# Patient Record
Sex: FEMALE | Race: BLACK OR AFRICAN AMERICAN | NOT HISPANIC OR LATINO | Employment: FULL TIME | URBAN - METROPOLITAN AREA
[De-identification: names, ages, dates, MRNs, and addresses within clinical notes are randomized per-mention and may not be internally consistent; named-entity substitution may affect disease eponyms.]

---

## 2017-01-05 ENCOUNTER — HOSPITAL ENCOUNTER (EMERGENCY)
Facility: HOSPITAL | Age: 22
Discharge: HOME | End: 2017-01-05
Attending: EMERGENCY MEDICINE
Payer: COMMERCIAL

## 2017-01-05 VITALS
HEART RATE: 79 BPM | OXYGEN SATURATION: 99 % | WEIGHT: 139 LBS | HEIGHT: 65 IN | RESPIRATION RATE: 18 BRPM | BODY MASS INDEX: 23.16 KG/M2 | TEMPERATURE: 98 F | SYSTOLIC BLOOD PRESSURE: 110 MMHG | DIASTOLIC BLOOD PRESSURE: 69 MMHG

## 2017-01-05 DIAGNOSIS — Z34.91 NORMAL IUP (INTRAUTERINE PREGNANCY) ON PRENATAL ULTRASOUND, FIRST TRIMESTER: Primary | ICD-10-CM

## 2017-01-05 DIAGNOSIS — R10.2 SUPRAPUBIC CRAMPING: ICD-10-CM

## 2017-01-05 LAB
ALBUMIN SERPL BCP-MCNC: 4.1 G/DL
ALP SERPL-CCNC: 37 U/L
ALT SERPL W/O P-5'-P-CCNC: 10 U/L
ANION GAP SERPL CALC-SCNC: 8 MMOL/L
AST SERPL-CCNC: 17 U/L
B-HCG UR QL: POSITIVE
BACTERIA #/AREA URNS HPF: ABNORMAL /HPF
BACTERIA GENITAL QL WET PREP: ABNORMAL
BASOPHILS # BLD AUTO: 0.02 K/UL
BASOPHILS NFR BLD: 0.3 %
BILIRUB SERPL-MCNC: 0.2 MG/DL
BILIRUB UR QL STRIP: NEGATIVE
BUN SERPL-MCNC: 12 MG/DL
CALCIUM SERPL-MCNC: 9.2 MG/DL
CHLORIDE SERPL-SCNC: 103 MMOL/L
CLARITY UR: ABNORMAL
CLUE CELLS VAG QL WET PREP: ABNORMAL
CO2 SERPL-SCNC: 25 MMOL/L
COLOR UR: YELLOW
CREAT SERPL-MCNC: 0.8 MG/DL
CTP QC/QA: YES
DIFFERENTIAL METHOD: ABNORMAL
EOSINOPHIL # BLD AUTO: 0 K/UL
EOSINOPHIL NFR BLD: 0.2 %
ERYTHROCYTE [DISTWIDTH] IN BLOOD BY AUTOMATED COUNT: 13.1 %
EST. GFR  (AFRICAN AMERICAN): >60 ML/MIN/1.73 M^2
EST. GFR  (NON AFRICAN AMERICAN): >60 ML/MIN/1.73 M^2
FILAMENT FUNGI VAG WET PREP-#/AREA: ABNORMAL
GLUCOSE SERPL-MCNC: 85 MG/DL
GLUCOSE UR QL STRIP: NEGATIVE
HCG INTACT+B SERPL-ACNC: 4658 MIU/ML
HCT VFR BLD AUTO: 34 %
HGB BLD-MCNC: 11.6 G/DL
HGB UR QL STRIP: NEGATIVE
KETONES UR QL STRIP: NEGATIVE
LEUKOCYTE ESTERASE UR QL STRIP: ABNORMAL
LYMPHOCYTES # BLD AUTO: 2.4 K/UL
LYMPHOCYTES NFR BLD: 39.1 %
MCH RBC QN AUTO: 29.3 PG
MCHC RBC AUTO-ENTMCNC: 34.1 %
MCV RBC AUTO: 86 FL
MICROSCOPIC COMMENT: ABNORMAL
MONOCYTES # BLD AUTO: 0.5 K/UL
MONOCYTES NFR BLD: 7.7 %
NEUTROPHILS # BLD AUTO: 3.3 K/UL
NEUTROPHILS NFR BLD: 52.7 %
NITRITE UR QL STRIP: NEGATIVE
PH UR STRIP: 6 [PH] (ref 5–8)
PLATELET # BLD AUTO: 336 K/UL
PMV BLD AUTO: 8.7 FL
POTASSIUM SERPL-SCNC: 3.6 MMOL/L
PROT SERPL-MCNC: 7.3 G/DL
PROT UR QL STRIP: NEGATIVE
RBC # BLD AUTO: 3.96 M/UL
RBC #/AREA URNS HPF: 2 /HPF (ref 0–4)
SODIUM SERPL-SCNC: 136 MMOL/L
SP GR UR STRIP: 1.01 (ref 1–1.03)
SPECIMEN SOURCE: ABNORMAL
SQUAMOUS #/AREA URNS HPF: 6 /HPF
T VAGINALIS GENITAL QL WET PREP: ABNORMAL
URN SPEC COLLECT METH UR: ABNORMAL
UROBILINOGEN UR STRIP-ACNC: NEGATIVE EU/DL
WBC # BLD AUTO: 6.21 K/UL
WBC #/AREA URNS HPF: 15 /HPF (ref 0–5)
WBC #/AREA VAG WET PREP: ABNORMAL
YEAST GENITAL QL WET PREP: ABNORMAL

## 2017-01-05 PROCEDURE — 81000 URINALYSIS NONAUTO W/SCOPE: CPT

## 2017-01-05 PROCEDURE — 99284 EMERGENCY DEPT VISIT MOD MDM: CPT | Mod: 25

## 2017-01-05 PROCEDURE — 85025 COMPLETE CBC W/AUTO DIFF WBC: CPT

## 2017-01-05 PROCEDURE — 84702 CHORIONIC GONADOTROPIN TEST: CPT

## 2017-01-05 PROCEDURE — 87210 SMEAR WET MOUNT SALINE/INK: CPT

## 2017-01-05 PROCEDURE — 87088 URINE BACTERIA CULTURE: CPT

## 2017-01-05 PROCEDURE — 25000003 PHARM REV CODE 250: Performed by: NURSE PRACTITIONER

## 2017-01-05 PROCEDURE — 80053 COMPREHEN METABOLIC PANEL: CPT

## 2017-01-05 PROCEDURE — 87086 URINE CULTURE/COLONY COUNT: CPT

## 2017-01-05 PROCEDURE — 87591 N.GONORRHOEAE DNA AMP PROB: CPT

## 2017-01-05 PROCEDURE — 81025 URINE PREGNANCY TEST: CPT | Performed by: EMERGENCY MEDICINE

## 2017-01-05 PROCEDURE — 87147 CULTURE TYPE IMMUNOLOGIC: CPT

## 2017-01-05 RX ORDER — NITROFURANTOIN 25; 75 MG/1; MG/1
100 CAPSULE ORAL 2 TIMES DAILY
Qty: 14 CAPSULE | Refills: 0 | Status: SHIPPED | OUTPATIENT
Start: 2017-01-05 | End: 2017-01-09

## 2017-01-05 RX ORDER — NITROFURANTOIN 25; 75 MG/1; MG/1
100 CAPSULE ORAL ONCE
Status: COMPLETED | OUTPATIENT
Start: 2017-01-05 | End: 2017-01-05

## 2017-01-05 RX ADMIN — NITROFURANTOIN (MONOHYDRATE/MACROCRYSTALS) 100 MG: 75; 25 CAPSULE ORAL at 08:01

## 2017-01-05 NOTE — ED AVS SNAPSHOT
[facility]  [place] [address]               Bonnie Mcdonald   2017  6:33 PM   ED    Description:  Female : 1995   Department:  [facility]           Your Care was Coordinated By:     Provider Role From To    Erin Hurtado MD Attending Provider 17 --    Mary Babcock NP Nurse Practitioner 17 --      Reason for Visit     Abdominal Cramping           Diagnoses this Visit        Comments    Normal IUP (intrauterine pregnancy) on prenatal ultrasound, first trimester    -  Primary     Suprapubic cramping           ED Disposition     ED Disposition Condition Comment    Discharge             To Do List           Follow-up Information     Follow up with Mildred Stoddard MD.    Specialty:  Obstetrics and Gynecology    Why:  OBGYN - CALL FOR APPOINTMENT FOR ROUTINE FOLLOW    Contact information:    [address] [place] [address] 7  [place] [address]  [phone]          Follow up with [facility].    Specialty:  Emergency Medicine    Why:  If symptoms worsen    Contact information:    [place] [address] [phone]       These Medications        Disp Refills Start End    nitrofurantoin, macrocrystal-monohydrate, (MACROBID) 100 MG capsule 14 capsule 0 2017    Take 1 capsule (100 mg total) by mouth 2 (two) times daily. - Oral    Pharmacy: [facility] [address] [place] [address] AT [facility] Ph #: [phone]         [facility]     [facility] -  Assistance  Registered nurses in the [facility] provide clinical advisement, health education, appointment booking, and other advisory services.  Call for this free service at [phone].             Medications           Message regarding Medications     Verify the changes and/or additions to your medication regime listed  "below are the same as discussed with your clinician today.  If any of these changes or additions are incorrect, please notify your healthcare provider.        START taking these NEW medications        Refills    nitrofurantoin, macrocrystal-monohydrate, (MACROBID) 100 MG capsule 0    Sig: Take 1 capsule (100 mg total) by mouth 2 (two) times daily.    Class: Print    Route: Oral      These medications were administered today        Dose Freq    nitrofurantoin (macrocrystal-monohydrate) 100 MG capsule 100 mg 100 mg Once    Sig: Take 1 capsule (100 mg total) by mouth once.    Class: Normal    Route: Oral      STOP taking these medications     PNV cmb#21-iron-folic acid (PRENATAL COMPLETE)  mg-mcg Tab Take 1 tablet by mouth once daily.           Verify that the below list of medications is an accurate representation of the medications you are currently taking.  If none reported, the list may be blank. If incorrect, please contact your healthcare provider. Carry this list with you in case of emergency.           Current Medications     nitrofurantoin, macrocrystal-monohydrate, (MACROBID) 100 MG capsule Take 1 capsule (100 mg total) by mouth 2 (two) times daily.           Clinical Reference Information           Your Vitals Were     BP Pulse Temp Resp Height Weight    107/52 78 98.5 °F (36.9 °C) 18 5' 5" (1.651 m) 63 kg (139 lb)    Last Period SpO2 BMI          11/27/2016 (Approximate) 99% 23.13 kg/m2        Allergies as of 1/5/2017     No Known Allergies      Immunizations Administered on Date of Encounter - 1/5/2017     None      ED Micro, Lab, POCT     Start Ordered       Status Ordering Provider    01/05/17 2049 01/05/17 2049  Urine culture **CANNOT BE ORDERED STAT**  Once      In process     01/05/17 1858 01/05/17 1857  hCG, quantitative, pregnancy  STAT      Final result     01/05/17 1858 01/05/17 1857  CBC auto differential  STAT      Final result     01/05/17 1858 01/05/17 1857  Comprehensive metabolic panel "  STAT      Final result     01/05/17 1856 01/05/17 1857  Vaginal Screen Vagina  STAT      Final result     01/05/17 1856 01/05/17 1857  C. trachomatis/N. gonorrhoeae by AMP DNA Cervix  STAT      In process     01/05/17 1826 01/05/17 1825  Urinalysis  STAT      Final result     01/05/17 1825 01/05/17 1825  Urinalysis Microscopic  Once      Final result     01/05/17 1808 01/05/17 1807  POCT urine pregnancy  Once      Final result       ED Imaging Orders     Start Ordered       Status Ordering Provider    01/05/17 1900 01/05/17 1900   OB Less Than 14 Wks with Transvaginal (xpd)  1 time imaging      Final result         Discharge Instructions       Please follow-up with an OB/GYN for further evaluation of your symptoms and for routine prenatal screening.    Take Macrobid for 1 week for a urinary tract infection.    You can take Tylenol for pain, as needed.  Please do not exceed 4000 mg of Tylenol a day.    Return to the emergency room for any new or worsening symptoms or concerns.    Your Scheduled Appointments     Jan 09, 2017 10:10 AM CST   New OB Visit-OCC with Gale Armstrong MD   [facility] (O[facility])    [address] [place] [id number]   [phone]              [facility] Sign-Up     Activating your [facility] account is as easy as 1-2-3!     1) Visit [email or web address], select Sign Up Now, enter this activation code and your date of birth, then select Next.  [email or web address]  Expires: 2/19/2017  9:00 PM      2) Create a username and password to use when you visit [facility] in the future and select a security question in case you lose your password and select Next.    3) Enter your e-mail address and click Sign Up!    Additional Information  If you have questions, please e-mail [email or web address] or call [phone] to talk to our [facility] staff. Remember, [facility] is NOT to be used for urgent needs. For medical emergencies, dial 911.          [facility] complies with  applicable Federal civil rights laws and does not discriminate on the basis of race, color, national origin, age, disability, or sex.        Language Assistance Services     ATTENTION: Language assistance services are available, free of charge. Please call [phone].      ATENCIÓN: Si habla tamiko, tiene a villalta disposición servicios gratuitos de asistencia lingüística. Llame al [phone].     CHÚ Ý: N?u b?n nói Ti?ng Vi?t, có các d?ch v? h? tr? ngôn ng? mi?n phí dành cho b?n. G?i s? [phone].

## 2017-01-06 NOTE — DISCHARGE INSTRUCTIONS
Please follow-up with an OB/GYN for further evaluation of your symptoms and for routine prenatal screening.    Take Macrobid for 1 week for a urinary tract infection.    You can take Tylenol for pain, as needed.  Please do not exceed 4000 mg of Tylenol a day.    Return to the emergency room for any new or worsening symptoms or concerns.

## 2017-01-06 NOTE — ED PROVIDER NOTES
Encounter Date: 2017    SCRIBE #1 NOTE: I, Sheree Knutson, am scribing for, and in the presence of,  Mary Babcock NP. I have scribed the following portions of the note - Other sections scribed: HPI and ROS.       History     Chief Complaint   Patient presents with    Abdominal Cramping     reports abdominal cramping for two days, had positive preg test yesterday, would like confirmed, denies n/v/d/f, denies medical hx     Review of patient's allergies indicates:  No Known Allergies  HPI Comments: CC: Abdominal Cramping    HPI: This [id number] 21 y.o. Female, LMP 16, with no medical history presents to the ED c/o acute lower abdominal cramping for the past 2x days. Pt reports taking a UPT at home with positive results. She presently denies experiencing the abdominal cramping. Pt denies nausea, emesis, diarrhea, urinary symptoms, fever, chills and vaginal bleeding. No other associated symptoms. Pt notes that she has not been evaluated by an OBGYN since becoming pregnant.        The history is provided by the patient. No  was used.     History reviewed. No pertinent past medical history.  Past Medical History Pertinent Negatives   Diagnosis Date Noted    Diabetes mellitus 2013     Past Surgical History   Procedure Laterality Date    Miscarriage        Family History   Problem Relation Age of Onset    Diabetes Father     Breast cancer Neg Hx     Colon cancer Neg Hx     Ovarian cancer Neg Hx      Social History   Substance Use Topics    Smoking status: Never Smoker    Smokeless tobacco: None    Alcohol use No     Review of Systems   Constitutional: Negative for chills and fever.   HENT: Negative for sore throat.    Respiratory: Negative for shortness of breath.    Cardiovascular: Negative for chest pain.   Gastrointestinal: Positive for abdominal pain (lower abdominal cramping; resolved). Negative for diarrhea, nausea and vomiting.   Genitourinary: Negative for dysuria and  vaginal bleeding.   Musculoskeletal: Negative for back pain.   Skin: Negative for rash.   Neurological: Negative for weakness.       Physical Exam   Initial Vitals   BP Pulse Resp Temp SpO2   01/05/17 1806 01/05/17 1806 01/05/17 1806 01/05/17 1806 01/05/17 1806   128/65 92 16 98.3 °F (36.8 °C) 99 %     Physical Exam    Nursing note and vitals reviewed.  Constitutional: Vital signs are normal. She appears well-developed and well-nourished. She is not diaphoretic. She is active and cooperative. She does not appear ill. No distress.   Eyes: Pupils are equal, round, and reactive to light.   Pulmonary/Chest: No respiratory distress.   Abdominal: Soft. Normal appearance. There is no tenderness. There is no rigidity, no rebound, no guarding and no CVA tenderness.   Genitourinary: Uterus normal. There is no rash or lesion on the right labia. There is no rash or lesion on the left labia. Cervix exhibits no motion tenderness, no discharge and no friability. Right adnexum displays no mass and no tenderness. Left adnexum displays no mass and no tenderness. No erythema, tenderness or bleeding in the vagina. No foreign body in the vagina. No signs of injury around the vagina. Vaginal discharge (moderate white discharge in the vaginal vault) found.   Genitourinary Comments: Cervical os is closed with no adnexal masses, tenderness, bleeding.   Neurological: She is alert and oriented to person, place, and time.         ED Course   Procedures  Labs Reviewed   URINALYSIS - Abnormal; Notable for the following:        Result Value    Appearance, UA Hazy (*)     Leukocytes, UA 3+ (*)     All other components within normal limits   URINALYSIS MICROSCOPIC - Abnormal; Notable for the following:     WBC, UA 15 (*)     All other components within normal limits   VAGINAL SCREEN - Abnormal; Notable for the following:     WBC - Vaginal Screen Occasional (*)     Bacteria - Vaginal Screen Moderate (*)     All other components within normal limits    CBC W/ AUTO DIFFERENTIAL - Abnormal; Notable for the following:     RBC 3.96 (*)     Hemoglobin 11.6 (*)     Hematocrit 34.0 (*)     MPV 8.7 (*)     All other components within normal limits   COMPREHENSIVE METABOLIC PANEL - Abnormal; Notable for the following:     Alkaline Phosphatase 37 (*)     All other components within normal limits   POCT URINE PREGNANCY - Abnormal; Notable for the following:     POC Preg Test, Ur Positive (*)     All other components within normal limits   C. TRACHOMATIS/N. GONORRHOEAE BY AMP DNA   CULTURE, URINE   HCG, QUANTITATIVE, PREGNANCY                Additional MDM:   Comments: This is an urgent evaluation of a 21-year-old gravid female that comes the emergency room complaining of lower abdominal cramping for 2 days.  Her LMP is 16; she is approximately.  She is  M1 A1.  She has not had prenatal follow for this pregnancy yet.  She had a positive home UPT 2 days ago and suspects that she is pregnant today.  She is completely asymptomatic on exam, with no abdominal pain or tenderness to palpation.  Her abdomen is soft, nondistended, with no peritoneal signs.  Her differential diagnoses include but are not limited to: Pregnancy, UTI, vaginal infection, ovarian cysts, uterine fibroids, endometriosis, threatened AB, ectopic pregnancy.  I carefully considered but highly doubted: Acute cholecystitis, pancreatitis, hepatitis, cholelithiasis, appendicitis, renal colic, diverticulitis, bowel obstruction.  Pelvic exam was grossly unremarkable, with no evidence of cervicitis, adnexal masses or tenderness, or rashes or lesions.  Her cervical os was closed.  There was no vaginal bleeding.  No concern for tubo-ovarian abscess or PID.  GC is pending.  Her blood work was significant for a hemoglobin and hematocrit 11/34-no indication for transfusion at this time.  She is blood type A POS on file; no indication for RhoGAM.  Her CMP showed no electrolyte disturbance or renal insufficiency.   Her transaminases , T bili, lipase were all within normal limits.  Her beta hCG was 4658-which clinically correlates with transvaginal ultrasound findings of a single IUP with estimated gestational age of 5 weeks and 5 days.  There were no suspicious or adnexal mass suggestive ectopic pregnancy.  There was no evidence of torsion.  There is no fetal cardiac activity appreciated, but this may be due to early gestation.  Her urine is possibly infected.  There were 3+ leukocytes with bacteria with pyuria.  Will send urine culture and cover her with Macrobid.  I advised the patient to follow-up with OB/GYN for routine follow and for further evaluation of her symptoms in 2-3 days.  Return precautions were given for any new or worsening symptoms or concerns.  The patient verbalized understanding and adherence to treatment plan.  Case discussed with attending, , and she is in agreement with plan. Stable for discharge and outpatient follow.  .          Scribe Attestation:   Scribe #1: I performed the above scribed service and the documentation accurately describes the services I performed. I attest to the accuracy of the note.    Attending Attestation:           Physician Attestation for Scribe:  Physician Attestation Statement for Scribe #1: I, Mary Babcock NP, reviewed documentation, as scribed by Sheree Knutson in my presence, and it is both accurate and complete.                 ED Course     Clinical Impression:   The primary encounter diagnosis was Normal IUP (intrauterine pregnancy) on prenatal ultrasound, first trimester. A diagnosis of Suprapubic cramping was also pertinent to this visit.    Disposition:   Disposition: Discharged  Condition: Stable       Mary Babcock NP  01/05/17 4709

## 2017-01-08 LAB — BACTERIA UR CULT: NORMAL

## 2017-01-09 PROBLEM — R82.71 GBS BACTERIURIA: Status: ACTIVE | Noted: 2017-01-09

## 2017-01-09 LAB
C TRACH DNA SPEC QL NAA+PROBE: NEGATIVE
N GONORRHOEA DNA SPEC QL NAA+PROBE: NEGATIVE

## 2017-01-12 PROBLEM — Z21 HIV ANTIBODY POSITIVE: Status: ACTIVE | Noted: 2017-01-12

## 2017-01-17 PROBLEM — B20 HIV (HUMAN IMMUNODEFICIENCY VIRUS INFECTION): Status: ACTIVE | Noted: 2017-01-12

## 2017-08-17 ENCOUNTER — SURGERY (OUTPATIENT)
Age: 22
End: 2017-08-17

## 2017-08-17 ENCOUNTER — HOSPITAL ENCOUNTER (INPATIENT)
Facility: HOSPITAL | Age: 22
LOS: 3 days | Discharge: HOME | End: 2017-08-20
Attending: OBSTETRICS & GYNECOLOGY | Admitting: OBSTETRICS & GYNECOLOGY
Payer: MEDICAID

## 2017-08-17 ENCOUNTER — ANESTHESIA EVENT (OUTPATIENT)
Dept: OBSTETRICS AND GYNECOLOGY | Facility: HOSPITAL | Age: 22
End: 2017-08-17
Payer: MEDICAID

## 2017-08-17 ENCOUNTER — ANESTHESIA (OUTPATIENT)
Dept: OBSTETRICS AND GYNECOLOGY | Facility: HOSPITAL | Age: 22
End: 2017-08-17
Payer: MEDICAID

## 2017-08-17 DIAGNOSIS — Z98.891 STATUS POST PRIMARY LOW TRANSVERSE CESAREAN SECTION: Primary | ICD-10-CM

## 2017-08-17 DIAGNOSIS — Z91.199 NON-COMPLIANCE: ICD-10-CM

## 2017-08-17 DIAGNOSIS — Z34.93 PREGNANCY WITH ONE FETUS, THIRD TRIMESTER: ICD-10-CM

## 2017-08-17 DIAGNOSIS — B20 HIV (HUMAN IMMUNODEFICIENCY VIRUS INFECTION): ICD-10-CM

## 2017-08-17 DIAGNOSIS — O98.713 HIV DISEASE AFFECTING PREGNANCY IN THIRD TRIMESTER, ANTEPARTUM: ICD-10-CM

## 2017-08-17 DIAGNOSIS — Z34.90 PREGNANCY WITH ONE FETUS: ICD-10-CM

## 2017-08-17 PROBLEM — O09.33 INSUFFICIENT PRENATAL CARE IN THIRD TRIMESTER: Status: ACTIVE | Noted: 2017-08-17

## 2017-08-17 LAB
ABO + RH BLD: NORMAL
BASOPHILS # BLD AUTO: 0.02 K/UL
BASOPHILS NFR BLD: 0.2 %
BLD GP AB SCN CELLS X3 SERPL QL: NORMAL
DIFFERENTIAL METHOD: ABNORMAL
EOSINOPHIL # BLD AUTO: 0 K/UL
EOSINOPHIL NFR BLD: 0.1 %
ERYTHROCYTE [DISTWIDTH] IN BLOOD BY AUTOMATED COUNT: 12.3 %
HCT VFR BLD AUTO: 27.6 %
HGB BLD-MCNC: 9.3 G/DL
LYMPHOCYTES # BLD AUTO: 2 K/UL
LYMPHOCYTES NFR BLD: 20.2 %
MCH RBC QN AUTO: 29.1 PG
MCHC RBC AUTO-ENTMCNC: 33.7 G/DL
MCV RBC AUTO: 86 FL
MONOCYTES # BLD AUTO: 1 K/UL
MONOCYTES NFR BLD: 10.1 %
NEUTROPHILS # BLD AUTO: 6.7 K/UL
NEUTROPHILS NFR BLD: 69.4 %
PLATELET # BLD AUTO: 320 K/UL
PMV BLD AUTO: 8.4 FL
RBC # BLD AUTO: 3.2 M/UL
WBC # BLD AUTO: 9.63 K/UL

## 2017-08-17 PROCEDURE — 63600175 PHARM REV CODE 636 W HCPCS: Performed by: OBSTETRICS & GYNECOLOGY

## 2017-08-17 PROCEDURE — 25000003 PHARM REV CODE 250: Performed by: OBSTETRICS & GYNECOLOGY

## 2017-08-17 PROCEDURE — 72100002 HC LABOR CARE, 1ST 8 HOURS

## 2017-08-17 PROCEDURE — 63600175 PHARM REV CODE 636 W HCPCS: Performed by: ANESTHESIOLOGY

## 2017-08-17 PROCEDURE — 25000003 PHARM REV CODE 250: Performed by: NURSE ANESTHETIST, CERTIFIED REGISTERED

## 2017-08-17 PROCEDURE — 99211 OFF/OP EST MAY X REQ PHY/QHP: CPT

## 2017-08-17 PROCEDURE — 86901 BLOOD TYPING SEROLOGIC RH(D): CPT

## 2017-08-17 PROCEDURE — 36415 COLL VENOUS BLD VENIPUNCTURE: CPT

## 2017-08-17 PROCEDURE — 59514 CESAREAN DELIVERY ONLY: CPT | Mod: CRNA,,, | Performed by: NURSE ANESTHETIST, CERTIFIED REGISTERED

## 2017-08-17 PROCEDURE — 11000001 HC ACUTE MED/SURG PRIVATE ROOM

## 2017-08-17 PROCEDURE — 59514 CESAREAN DELIVERY ONLY: CPT | Mod: AT,,, | Performed by: OBSTETRICS & GYNECOLOGY

## 2017-08-17 PROCEDURE — 87901 NFCT AGT GNTYP ALYS HIV1 REV: CPT

## 2017-08-17 PROCEDURE — 36000685 HC CESAREAN SECTION LEVEL I

## 2017-08-17 PROCEDURE — 63600175 PHARM REV CODE 636 W HCPCS: Performed by: NURSE ANESTHETIST, CERTIFIED REGISTERED

## 2017-08-17 PROCEDURE — 86361 T CELL ABSOLUTE COUNT: CPT

## 2017-08-17 PROCEDURE — 27200688 HC TRAY, SPINAL-HYPER/ ISOBARIC: Performed by: ANESTHESIOLOGY

## 2017-08-17 PROCEDURE — 51702 INSERT TEMP BLADDER CATH: CPT

## 2017-08-17 PROCEDURE — 87536 HIV-1 QUANT&REVRSE TRNSCRPJ: CPT

## 2017-08-17 PROCEDURE — 86900 BLOOD TYPING SEROLOGIC ABO: CPT

## 2017-08-17 PROCEDURE — 37000009 HC ANESTHESIA EA ADD 15 MINS: Performed by: OBSTETRICS & GYNECOLOGY

## 2017-08-17 PROCEDURE — S0028 INJECTION, FAMOTIDINE, 20 MG: HCPCS | Performed by: OBSTETRICS & GYNECOLOGY

## 2017-08-17 PROCEDURE — 37000008 HC ANESTHESIA 1ST 15 MINUTES: Performed by: OBSTETRICS & GYNECOLOGY

## 2017-08-17 PROCEDURE — 59514 CESAREAN DELIVERY ONLY: CPT | Mod: ANES,,, | Performed by: ANESTHESIOLOGY

## 2017-08-17 PROCEDURE — 85025 COMPLETE CBC W/AUTO DIFF WBC: CPT

## 2017-08-17 RX ORDER — NALOXONE HCL 0.4 MG/ML
0.02 VIAL (ML) INJECTION
Status: DISCONTINUED | OUTPATIENT
Start: 2017-08-17 | End: 2017-08-18

## 2017-08-17 RX ORDER — LOPINAVIR AND RITONAVIR 200; 50 MG/1; MG/1
2 TABLET, FILM COATED ORAL 2 TIMES DAILY
Status: CANCELLED | OUTPATIENT
Start: 2017-08-17

## 2017-08-17 RX ORDER — OXYCODONE AND ACETAMINOPHEN 5; 325 MG/1; MG/1
1 TABLET ORAL EVERY 4 HOURS PRN
Status: DISCONTINUED | OUTPATIENT
Start: 2017-08-17 | End: 2017-08-20 | Stop reason: HOSPADM

## 2017-08-17 RX ORDER — DIPHENHYDRAMINE HCL 25 MG
25 CAPSULE ORAL EVERY 4 HOURS PRN
Status: DISCONTINUED | OUTPATIENT
Start: 2017-08-17 | End: 2017-08-20 | Stop reason: HOSPADM

## 2017-08-17 RX ORDER — AMOXICILLIN 250 MG
1 CAPSULE ORAL NIGHTLY PRN
Status: DISCONTINUED | OUTPATIENT
Start: 2017-08-17 | End: 2017-08-20 | Stop reason: HOSPADM

## 2017-08-17 RX ORDER — METOCLOPRAMIDE 10 MG/1
10 TABLET ORAL ONCE
Status: DISCONTINUED | OUTPATIENT
Start: 2017-08-17 | End: 2017-08-17

## 2017-08-17 RX ORDER — CARBOPROST TROMETHAMINE 250 UG/ML
250 INJECTION, SOLUTION INTRAMUSCULAR
Status: DISCONTINUED | OUTPATIENT
Start: 2017-08-17 | End: 2017-08-17

## 2017-08-17 RX ORDER — SIMETHICONE 80 MG
1 TABLET,CHEWABLE ORAL EVERY 6 HOURS PRN
Status: DISCONTINUED | OUTPATIENT
Start: 2017-08-17 | End: 2017-08-20 | Stop reason: HOSPADM

## 2017-08-17 RX ORDER — ONDANSETRON 2 MG/ML
4 INJECTION INTRAMUSCULAR; INTRAVENOUS EVERY 12 HOURS PRN
Status: DISCONTINUED | OUTPATIENT
Start: 2017-08-17 | End: 2017-08-18

## 2017-08-17 RX ORDER — SODIUM CITRATE AND CITRIC ACID MONOHYDRATE 334; 500 MG/5ML; MG/5ML
30 SOLUTION ORAL
Status: DISCONTINUED | OUTPATIENT
Start: 2017-08-17 | End: 2017-08-17

## 2017-08-17 RX ORDER — LAMIVUDINE AND ZIDOVUDINE 150; 300 MG/1; MG/1
1 TABLET, FILM COATED ORAL EVERY 12 HOURS
Status: CANCELLED | OUTPATIENT
Start: 2017-08-17

## 2017-08-17 RX ORDER — METOCLOPRAMIDE HYDROCHLORIDE 5 MG/ML
10 INJECTION INTRAMUSCULAR; INTRAVENOUS
Status: COMPLETED | OUTPATIENT
Start: 2017-08-17 | End: 2017-08-17

## 2017-08-17 RX ORDER — FAMOTIDINE 10 MG/ML
20 INJECTION INTRAVENOUS
Status: DISCONTINUED | OUTPATIENT
Start: 2017-08-17 | End: 2017-08-17

## 2017-08-17 RX ORDER — MEPERIDINE HYDROCHLORIDE 50 MG/ML
50 INJECTION INTRAMUSCULAR; INTRAVENOUS; SUBCUTANEOUS ONCE
Status: COMPLETED | OUTPATIENT
Start: 2017-08-17 | End: 2017-08-17

## 2017-08-17 RX ORDER — SODIUM CHLORIDE, SODIUM LACTATE, POTASSIUM CHLORIDE, CALCIUM CHLORIDE 600; 310; 30; 20 MG/100ML; MG/100ML; MG/100ML; MG/100ML
INJECTION, SOLUTION INTRAVENOUS CONTINUOUS
Status: DISCONTINUED | OUTPATIENT
Start: 2017-08-17 | End: 2017-08-20

## 2017-08-17 RX ORDER — METHYLERGONOVINE MALEATE 0.2 MG/ML
200 INJECTION INTRAVENOUS
Status: DISCONTINUED | OUTPATIENT
Start: 2017-08-17 | End: 2017-08-17

## 2017-08-17 RX ORDER — LAMIVUDINE AND ZIDOVUDINE 150; 300 MG/1; MG/1
1 TABLET, FILM COATED ORAL EVERY 12 HOURS
Status: DISCONTINUED | OUTPATIENT
Start: 2017-08-17 | End: 2017-08-18

## 2017-08-17 RX ORDER — KETOROLAC TROMETHAMINE 30 MG/ML
INJECTION, SOLUTION INTRAMUSCULAR; INTRAVENOUS
Status: DISCONTINUED | OUTPATIENT
Start: 2017-08-17 | End: 2017-08-17

## 2017-08-17 RX ORDER — OXYTOCIN 10 [USP'U]/ML
INJECTION, SOLUTION INTRAMUSCULAR; INTRAVENOUS
Status: DISCONTINUED | OUTPATIENT
Start: 2017-08-17 | End: 2017-08-17

## 2017-08-17 RX ORDER — CEFAZOLIN SODIUM 2 G/50ML
2 SOLUTION INTRAVENOUS
Status: COMPLETED | OUTPATIENT
Start: 2017-08-17 | End: 2017-08-17

## 2017-08-17 RX ORDER — OXYTOCIN/RINGER'S LACTATE 20/1000 ML
41.65 PLASTIC BAG, INJECTION (ML) INTRAVENOUS CONTINUOUS
Status: ACTIVE | OUTPATIENT
Start: 2017-08-17 | End: 2017-08-18

## 2017-08-17 RX ORDER — FENTANYL CITRATE 50 UG/ML
INJECTION, SOLUTION INTRAMUSCULAR; INTRAVENOUS
Status: DISCONTINUED | OUTPATIENT
Start: 2017-08-17 | End: 2017-08-17

## 2017-08-17 RX ORDER — LOPINAVIR AND RITONAVIR 200; 50 MG/1; MG/1
2 TABLET, FILM COATED ORAL 2 TIMES DAILY
Status: DISCONTINUED | OUTPATIENT
Start: 2017-08-17 | End: 2017-08-18

## 2017-08-17 RX ORDER — DEXTROSE, SODIUM CHLORIDE, SODIUM LACTATE, POTASSIUM CHLORIDE, AND CALCIUM CHLORIDE 5; .6; .31; .03; .02 G/100ML; G/100ML; G/100ML; G/100ML; G/100ML
INJECTION, SOLUTION INTRAVENOUS CONTINUOUS
Status: DISCONTINUED | OUTPATIENT
Start: 2017-08-17 | End: 2017-08-20 | Stop reason: HOSPADM

## 2017-08-17 RX ORDER — HYDROMORPHONE HCL IN 0.9% NACL 6 MG/30 ML
PATIENT CONTROLLED ANALGESIA SYRINGE INTRAVENOUS CONTINUOUS
Status: DISCONTINUED | OUTPATIENT
Start: 2017-08-17 | End: 2017-08-18

## 2017-08-17 RX ORDER — DIPHENHYDRAMINE HYDROCHLORIDE 50 MG/ML
12.5 INJECTION INTRAMUSCULAR; INTRAVENOUS EVERY 4 HOURS PRN
Status: DISCONTINUED | OUTPATIENT
Start: 2017-08-17 | End: 2017-08-18

## 2017-08-17 RX ORDER — OXYCODONE AND ACETAMINOPHEN 10; 325 MG/1; MG/1
1 TABLET ORAL EVERY 4 HOURS PRN
Status: DISCONTINUED | OUTPATIENT
Start: 2017-08-17 | End: 2017-08-20 | Stop reason: HOSPADM

## 2017-08-17 RX ORDER — MISOPROSTOL 200 UG/1
800 TABLET ORAL
Status: DISCONTINUED | OUTPATIENT
Start: 2017-08-17 | End: 2017-08-20 | Stop reason: HOSPADM

## 2017-08-17 RX ORDER — ONDANSETRON HYDROCHLORIDE 2 MG/ML
INJECTION, SOLUTION INTRAMUSCULAR; INTRAVENOUS
Status: DISCONTINUED | OUTPATIENT
Start: 2017-08-17 | End: 2017-08-17

## 2017-08-17 RX ADMIN — SODIUM CHLORIDE, SODIUM LACTATE, POTASSIUM CHLORIDE, AND CALCIUM CHLORIDE: .6; .31; .03; .02 INJECTION, SOLUTION INTRAVENOUS at 09:08

## 2017-08-17 RX ADMIN — ONDANSETRON 4 MG: 2 INJECTION, SOLUTION INTRAMUSCULAR; INTRAVENOUS at 04:08

## 2017-08-17 RX ADMIN — FENTANYL CITRATE 50 MCG: 50 INJECTION, SOLUTION INTRAMUSCULAR; INTRAVENOUS at 05:08

## 2017-08-17 RX ADMIN — FENTANYL CITRATE 10 MCG: 50 INJECTION, SOLUTION INTRAMUSCULAR; INTRAVENOUS at 05:08

## 2017-08-17 RX ADMIN — LOPINAVIR AND RITONAVIR 2 TABLET: 200; 50 TABLET, FILM COATED ORAL at 09:08

## 2017-08-17 RX ADMIN — Medication 20 MG: at 05:08

## 2017-08-17 RX ADMIN — Medication: at 06:08

## 2017-08-17 RX ADMIN — ZIDOVUDINE 1 MG/KG/HR: 10 INJECTION, SOLUTION INTRAVENOUS at 03:08

## 2017-08-17 RX ADMIN — PROMETHAZINE HYDROCHLORIDE 25 MG: 25 INJECTION INTRAMUSCULAR; INTRAVENOUS at 08:08

## 2017-08-17 RX ADMIN — FAMOTIDINE 20 MG: 10 INJECTION, SOLUTION INTRAVENOUS at 04:08

## 2017-08-17 RX ADMIN — OXYTOCIN 20 UNITS: 10 INJECTION, SOLUTION INTRAMUSCULAR; INTRAVENOUS at 05:08

## 2017-08-17 RX ADMIN — SODIUM CHLORIDE, SODIUM LACTATE, POTASSIUM CHLORIDE, AND CALCIUM CHLORIDE: .6; .31; .03; .02 INJECTION, SOLUTION INTRAVENOUS at 05:08

## 2017-08-17 RX ADMIN — ONDANSETRON 4 MG: 2 INJECTION INTRAMUSCULAR; INTRAVENOUS at 11:08

## 2017-08-17 RX ADMIN — SODIUM CITRATE AND CITRIC ACID MONOHYDRATE 30 ML: 500; 334 SOLUTION ORAL at 04:08

## 2017-08-17 RX ADMIN — SODIUM CHLORIDE, SODIUM LACTATE, POTASSIUM CHLORIDE, AND CALCIUM CHLORIDE: .6; .31; .03; .02 INJECTION, SOLUTION INTRAVENOUS at 08:08

## 2017-08-17 RX ADMIN — MEPERIDINE HYDROCHLORIDE 50 MG: 50 INJECTION INTRAMUSCULAR; INTRAVENOUS; SUBCUTANEOUS at 08:08

## 2017-08-17 RX ADMIN — SODIUM CHLORIDE, SODIUM LACTATE, POTASSIUM CHLORIDE, AND CALCIUM CHLORIDE: .6; .31; .03; .02 INJECTION, SOLUTION INTRAVENOUS at 02:08

## 2017-08-17 RX ADMIN — METOCLOPRAMIDE 10 MG: 5 INJECTION, SOLUTION INTRAMUSCULAR; INTRAVENOUS at 04:08

## 2017-08-17 RX ADMIN — LAMIVUDINE AND ZIDOVUDINE 1 TABLET: 150; 300 TABLET, FILM COATED ORAL at 09:08

## 2017-08-17 RX ADMIN — SODIUM CHLORIDE, SODIUM LACTATE, POTASSIUM CHLORIDE, AND CALCIUM CHLORIDE: .6; .31; .03; .02 INJECTION, SOLUTION INTRAVENOUS at 11:08

## 2017-08-17 RX ADMIN — CEFAZOLIN SODIUM 2 G: 2 SOLUTION INTRAVENOUS at 04:08

## 2017-08-17 RX ADMIN — ZIDOVUDINE 115 MG: 10 INJECTION, SOLUTION INTRAVENOUS at 01:08

## 2017-08-17 RX ADMIN — FENTANYL CITRATE 40 MCG: 50 INJECTION, SOLUTION INTRAMUSCULAR; INTRAVENOUS at 05:08

## 2017-08-17 RX ADMIN — KETOROLAC TROMETHAMINE 30 MG: 30 INJECTION, SOLUTION INTRAMUSCULAR; INTRAVENOUS at 05:08

## 2017-08-17 RX ADMIN — OXYTOCIN 5 UNITS: 10 INJECTION, SOLUTION INTRAMUSCULAR; INTRAVENOUS at 05:08

## 2017-08-17 NOTE — ANESTHESIA PREPROCEDURE EVALUATION
2017  Bonnie Mcdonald is a 22 y.o., female   Pre-operative evaluation for Procedure(s) (LRB):  DELIVERY- SECTION (N/A)    Bonnie Mcdonald is a 22 y.o. female   OB History      Para Term  AB Living    5 2 2   2 2    SAB TAB Ectopic Multiple Live Births    1 1     2            Patient Active Problem List   Diagnosis    GBS bacteriuria    HIV (human immunodeficiency virus infection)    Pregnancy with one fetus    HIV disease affecting pregnancy in third trimester, antepartum    Non-compliance    Insufficient prenatal care in third trimester       Review of patient's allergies indicates:  No Known Allergies    No current facility-administered medications on file prior to encounter.      Current Outpatient Prescriptions on File Prior to Encounter   Medication Sig Dispense Refill    prenatal vit#103-iron-FA () 27 mg iron- 1 mg Tab Take 1 tablet by mouth once daily. 30 tablet 11       Past Surgical History:   Procedure Laterality Date    miscarriage          Social History     Social History    Marital status: Single     Spouse name: N/A    Number of children: N/A    Years of education: N/A     Occupational History    Not on file.     Social History Main Topics    Smoking status: Never Smoker    Smokeless tobacco: Not on file    Alcohol use No    Drug use: No    Sexual activity: Yes     Partners: Male     Birth control/ protection: None     Other Topics Concern    Not on file     Social History Narrative    No narrative on file         Vital Signs Range (Last 24H):  Temp:  [36.8 °C (98.3 °F)]   Pulse:  []   Resp:  [18]   BP: (106-125)/(57-76)   SpO2:  [98 %-100 %]       CBC:   Recent Labs      17   1219   WBC  9.63   RBC  3.20*   HGB  9.3*   HCT  27.6*   PLT  320   MCV  86   MCH  29.1   MCHC  33.7     Anesthesia Evaluation    I have reviewed the  Patient Summary Reports.     I have reviewed the Medications.     Review of Systems  Anesthesia Hx:  No problems with previous Anesthesia Denies Hx of Anesthetic complications  Denies Family Hx of Anesthesia complications.   Denies Personal Hx of Anesthesia complications.   Social:  No Alcohol Use, Non-Smoker    Hematology/Oncology:  Hematology Normal   Oncology Normal     EENT/Dental:EENT/Dental Normal   Cardiovascular:  Cardiovascular Normal Exercise tolerance: good     Pulmonary:  Pulmonary Normal    Renal/:  Renal/ Normal     Hepatic/GI:  Hepatic/GI Normal    OB/GYN/PEDS:   presenting for primary section secondary to HIV positive status.    Neurological:  Neurology Normal    Endocrine:  Endocrine Normal    Psych:  Psychiatric Normal           Physical Exam  General:  Well nourished    Airway/Jaw/Neck:  Airway Findings: Mouth Opening: Normal Tongue: Normal  General Airway Assessment: Adult, Average  Mallampati: II  TM Distance: Normal, at least 6 cm  Jaw/Neck Findings:  Neck ROM: Normal ROM      Dental:  Dental Findings: In tact   Chest/Lungs:  Chest/Lungs Findings: Normal Respiratory Rate, Clear to auscultation     Heart/Vascular:  Heart Findings: Rate: Normal  Rhythm: Regular Rhythm        Mental Status:  Mental Status Findings:  Alert and Oriented, Cooperative         Anesthesia Plan  Type of Anesthesia, risks & benefits discussed:  Anesthesia Type:  spinal  Patient's Preference:   Intra-op Monitoring Plan: standard ASA monitors  Intra-op Monitoring Plan Comments:   Post Op Pain Control Plan: multimodal analgesia and PCA  Post Op Pain Control Plan Comments:   Induction:    Beta Blocker:  Patient is not currently on a Beta-Blocker (No further documentation required).       Informed Consent: Patient understands risks and agrees with Anesthesia plan.  Questions answered. Anesthesia consent signed with patient.  ASA Score: 2     Day of Surgery Review of History & Physical:    H&P update referred to the  surgeon.         Ready For Surgery From Anesthesia Perspective.

## 2017-08-17 NOTE — H&P
[facility]  Obstetrics  History & Physical    Patient Name: Bonnie Mcdonald  MRN: [id number]  Admission Date: 2017  Primary Care Provider: Primary Doctor No    Subjective:     Principal Problem:HIV disease affecting pregnancy in third trimester, antepartum    History of Present Illness:  Bonnie Mcdonald is a 22 y.o.  at 37w6d here for contractions. She has had no prenatal care, she has HIV.     Obstetric HPI:  Patient reports contractions, active fetal movement, No vaginal bleeding , No loss of fluid     This pregnancy has been complicated by HIV without treatment and unknown viral load. Viral load in January was 960.     Obstetric History       T2      L2     SAB1   TAB0   Ectopic0   Multiple0   Live Births2       # Outcome Date GA Lbr John/2nd Weight Sex Delivery Anes PTL Lv   5 Current            4 Term 13   3.204 kg (7 lb 1 oz) M Vag-Spont EPI N NOHEMY   3 Term 07/15/11   2.458 kg (5 lb 6.7 oz) M Vag-Spont EPI N NOHEMY   2 TAB            1 SAB                 History reviewed. No pertinent past medical history.  Past Surgical History:   Procedure Laterality Date    miscarriage          PTA Medications   Medication Sig    prenatal vit#103-iron-FA () 27 mg iron- 1 mg Tab Take 1 tablet by mouth once daily.       Review of patient's allergies indicates:  No Known Allergies     Family History     None        Social History Main Topics    Smoking status: Never Smoker    Smokeless tobacco: Not on file    Alcohol use No    Drug use: No    Sexual activity: Yes     Partners: Male     Birth control/ protection: None     Review of Systems   Constitutional: Negative for chills, fatigue and fever.   Respiratory: Negative for shortness of breath.    Cardiovascular: Negative for chest pain and palpitations.   Gastrointestinal: Positive for abdominal pain. Negative for constipation, diarrhea, nausea and vomiting.   Genitourinary: Negative for dysuria, frequency, pelvic pain,  urgency, vaginal discharge and vaginal pain.      Objective:     Vital Signs (Most Recent):  Temp: 98.3 °F (36.8 °C) (17 0734)  Pulse: 97 (17 0821)  Resp: 18 (17 0734)  BP: 125/76 (17 0821)  SpO2: 100 % (17 0819) Vital Signs (24h Range):  Temp:  [98.3 °F (36.8 °C)] 98.3 °F (36.8 °C)  Pulse:  [] 97  Resp:  [18] 18  SpO2:  [99 %-100 %] 100 %  BP: (110-125)/(70-76) 125/76     Weight: 57.6 kg (127 lb)  Body mass index is 20.5 kg/m².    FHT: 150 Cat 1 (reassuring)  TOCO:  Q 5-10 minutes    Physical Exam:   Constitutional: She is oriented to person, place, and time. She appears well-developed and well-nourished. No distress.    HENT:   Head: Normocephalic and atraumatic.     Neck: Normal range of motion. Neck supple.    Cardiovascular: Normal rate and normal heart sounds.  Exam reveals no gallop, no friction rub and no edema.    No murmur heard.   Pulmonary/Chest: Effort normal. No respiratory distress.        Abdominal: Soft. Bowel sounds are normal. She exhibits no distension and no mass. There is no tenderness. There is no guarding.     Genitourinary:   Genitourinary Comments: Uterus size equal to dates.            Musculoskeletal: Normal range of motion.       Neurological: She is alert and oriented to person, place, and time.    Skin: Skin is warm and dry. No pallor.    Psychiatric: She has a normal mood and affect. Her behavior is normal.       Cervix:  Dilation:  2  Effacement:  50%  Station: -2  Presentation: Vertex     Significant Labs:  Lab Results   Component Value Date    GROUPH A POS 2013    HEPBSAG NR 2017       I have personallly reviewed all pertinent lab results from the last 24 hours.    Assessment/Plan:     22 y.o. female  at 37w6d for:    * HIV disease affecting pregnancy in third trimester, antepartum    Start AZT at 2mg/kg loading dose then AZT 1mg/kg/hr for 3 hours.   Plan for delivery at 3 hours after starting AZT.   Delivery today due to  unknown viral load and contractions.   Start combivir and kaletra after delivery.             Jocelyn Chan MD  Obstetrics  [facility]

## 2017-08-17 NOTE — ANESTHESIA PROCEDURE NOTES
Spinal    Diagnosis: IUP  Patient location during procedure: OR  Start time: 8/17/2017 5:02 PM  Timeout: 8/17/2017 5:01 PM  End time: 8/17/2017 5:04 PM  Staffing  Anesthesiologist: LEXI BELLA  Performed: anesthesiologist   Preanesthetic Checklist  Completed: patient identified, site marked, surgical consent, pre-op evaluation, timeout performed, IV checked, risks and benefits discussed and monitors and equipment checked  Spinal Block  Patient position: sitting  Prep: ChloraPrep  Patient monitoring: heart rate  Approach: midline  Location: L3-4  Injection technique: single shot  CSF Fluid: clear free-flowing CSF  Needle  Needle type: pencil-tip   Needle gauge: 25 G  Needle length: 3.5 in  Additional Documentation: incremental injection, no paresthesia on injection and negative aspiration for heme  Needle localization: anatomical landmarks  Assessment  Ease of block: easy  Patient's tolerance of the procedure: no complaints and comfortable throughout block  Medications:  Bolus administered: 1.6 mL of with dextrose and 0.75 bupivacaine  Epinephrine added: none  Opioid administered: 10 mcg of   fentanyl

## 2017-08-17 NOTE — HPI
Bonnie Mcdonald is a 22 y.o.  at 37w6d here for contractions. She has had no prenatal care, she has HIV.

## 2017-08-17 NOTE — L&D DELIVERY NOTE
[facility] [place]   Section   Operative Note    SUMMARY     Date of Procedure: 2017     Procedure: Procedure(s) (LRB):  DELIVERY- SECTION (Bilateral)    Surgeon(s) and Role:     * Jocelyn Chan MD - Primary    Assisting Surgeon: None    Pre-Operative Diagnosis: HIV in pregnancy with unknown viral load, history of viral load 963 in 2017 without HAART.     Post-Operative Diagnosis: Post-Op Diagnosis Codes:     * Pregnant [Z33.1]    Anesthesia: Spinal/Epidural    Technical Procedures Used: Primary C/S delivery           Description of the Findings of the Procedure: Male infant, vertex position, APGAR 9/9, Weight: 1718g; Normal appearing ovaries, fallopian tubes, and uterine outline.      The patient was seen in the labor room. The risks, benefits, complications, treatment options, and expected outcomes were discussed with the patient.  The patient concurred with the proposed plan, giving informed consent.  The site of surgery properly noted/marked. The patient was taken to Operating Room, identified as Bonnie Mcdonald and the procedure verified as  Delivery. A Time Out was held and the above information confirmed.    After induction of anesthesia, the patient was prepped and draped in the usual sterile manner. Tressa revealed an adequate level of anesthesia. Traxi device was not used. A Pfannenstiel incision was made and carried down through the subcutaneous tissue to the fascia. Fascial incision was made and extended transversely. The fascia was grasped with Kocher clamps and  from the underlying rectus tissue superiorly and inferiorly. The peritoneum was identified, found to be free of adherent bowl and entered bluntly. Peritoneal incision was extended longitudinally. The vesico-uterine peritoneum was identified and bladder blade was inserted. A low transverse uterine incision was made with knife. Infant was noted to be in vertex position. The head was brought to the  incision and elevated out of the pelvis. The patient delivered a single viable male infant without difficulty. After the umbilical cord was clamped and cut, cord blood was obtained for evaluation. The placenta was removed intact and appeared normal.  Infant weighed 2835 grams with Apgar scores of 9/9 at one and five minutes respectively. The uterus was exteriorized. The uterine outline, tubes and ovaries appeared normal. The uterine incision was closed with running locked sutures of #1 monocryl. A second embrocating layer was placed.  Hemostasis was observed. The abdominal cavity was suctioned to remove excess fluid from the cul-de-sac. The uterus was returned to the abdominal cavity. Incision was reinspected and good hemostasis was noted. The abdominal cavity was suctioned out to remove excess fluid. The peritoneum and muscle was reapproximated with 2-0 chromic gut in running fashion. The fascia was then reapproximated with running sutures of #1 Vicryl. The subcutaneous tissue was irrigated and hemostasis achieved with bipolar electrocautery. The skin was reapproximated with insorb stapler.    Instrument, sponge, and needle counts were correct prior the abdominal closure and again at the conclusion of the case.     Significant Surgical Tasks Conducted by the Assistant(s), if Applicable: none    Complications: No    Blood Loss: 440 mL           Specimens:   Specimen (12h ago through future)    None          Condition: Good    Disposition: PACU - hemodynamically stable.    Attestation: Good         Delivery Information for  Roberto Mcdonald    Birth information:  YOB: 2019   Time of birth: 5:18 PM   Sex: male   Head Delivery Date/Time: 2017  5:18 PM   Delivery type: , Low Transverse   Gestational Age: 37w6d    Delivery Providers    Delivering clinician:  Jocelyn Chan MD   Other personnel:   Provider Role   KEILA Hansen MD Ardis G  JACKSON Montez RN                    [place] Assessment    Living status:  Living  Apgars:     1 Minute:   5 Minute:   10 Minute:   15 Minute:   20 Minute:     Skin Color:   1  1       Heart Rate:   2  2       Reflex Irritability:   2  2       Muscle Tone:   2  2       Respiratory Effort:   2  2       Total:   9  9                      Assisted Delivery Details:    Forceps attempted?:  No  Vacuum extractor attempted?:  No         Shoulder Dystocia    Shoulder dystocia present?:  No           Presentation and Position    Presentation:   Vertex   Position:                   Interventions/Resuscitation    Method:  None, Bulb Suctioning, Tactile Stimulation       Cord    Vessels:  3 vessels  Complications:  None  Delayed Cord Clamping?:  No  Cord Clamped Date/Time:  2017  5:18 PM  Cord Blood Disposition:  Sent with Baby  Gases Sent?:  No  Stem Cell Collection (by MD):  No       Placenta    Date and time:  2017  5:18 PM  Removal:  Manual removal  Appearance:  Intact  Placenta disposition:  discarded           Labor Events:       labor: No     Labor Onset Date/Time:         Dilation Complete Date/Time:         Start Pushing Date/Time:       Rupture Date/Time:              Rupture type:           Fluid Amount:        Fluid Color:        Fluid Odor:        Membrane Status (PeriCalm):        Rupture Date/Time (PeriCalm):        Fluid Amount (PeriCalm):        Fluid Color (PeriCalm):         steroids:       Antibiotics given for GBS: No     Induction: none     Indications for induction:        Augmentation:       Indications for augmentation:       Labor complications: None     Additional complications:          Cervical ripening:                     Delivery:      Episiotomy: None     Indication for Episiotomy:       Perineal Lacerations: None Repaired:      Periurethral Laceration: none Repaired:     Labial Laceration: none Repaired:     Sulcus Laceration: none Repaired:      Vaginal Laceration: No Repaired:     Cervical Laceration: No Repaired:     Repair suture: None     Repair # of packets:       Vaginal delivery QBL (mL): 0      QBL (mL): 440     Combined Blood Loss (mL): 440     Vaginal Sweep Performed: Yes     Surgicount Correct: Yes       Other providers:       Anesthesia    Method:  Spinal          Details (if applicable):  Trial of Labor No    Categorization: Primary    Priority: Routine   Indications for : Other (Add Comments)   Incision Type:       Additional  information:  Forceps:    Vacuum:    Breech:    Observed anomalies    Other (Comments):

## 2017-08-17 NOTE — SUBJECTIVE & OBJECTIVE
Obstetric HPI:  Patient reports contractions, active fetal movement, No vaginal bleeding , No loss of fluid     This pregnancy has been complicated by HIV without treatment and unknown viral load. Viral load in January was 960.     Obstetric History       T2      L2     SAB1   TAB0   Ectopic0   Multiple0   Live Births2       # Outcome Date GA Lbr John/2nd Weight Sex Delivery Anes PTL Lv   5 Current            4 Term 13   3.204 kg (7 lb 1 oz) M Vag-Spont EPI N NOHEMY   3 Term 07/15/11   2.458 kg (5 lb 6.7 oz) M Vag-Spont EPI N NOHEMY   2 TAB            1 SAB                 History reviewed. No pertinent past medical history.  Past Surgical History:   Procedure Laterality Date    miscarriage          PTA Medications   Medication Sig    prenatal vit#103-iron-FA () 27 mg iron- 1 mg Tab Take 1 tablet by mouth once daily.       Review of patient's allergies indicates:  No Known Allergies     Family History     None        Social History Main Topics    Smoking status: Never Smoker    Smokeless tobacco: Not on file    Alcohol use No    Drug use: No    Sexual activity: Yes     Partners: Male     Birth control/ protection: None     Review of Systems   Constitutional: Negative for chills, fatigue and fever.   Respiratory: Negative for shortness of breath.    Cardiovascular: Negative for chest pain and palpitations.   Gastrointestinal: Positive for abdominal pain. Negative for constipation, diarrhea, nausea and vomiting.   Genitourinary: Negative for dysuria, frequency, pelvic pain, urgency, vaginal discharge and vaginal pain.      Objective:     Vital Signs (Most Recent):  Temp: 98.3 °F (36.8 °C) (17 0734)  Pulse: 97 (17 0821)  Resp: 18 (17 0734)  BP: 125/76 (17 0821)  SpO2: 100 % (17 0819) Vital Signs (24h Range):  Temp:  [98.3 °F (36.8 °C)] 98.3 °F (36.8 °C)  Pulse:  [] 97  Resp:  [18] 18  SpO2:  [99 %-100 %] 100 %  BP: (110-125)/(70-76) 125/76     Weight: 57.6  kg (127 lb)  Body mass index is 20.5 kg/m².    FHT: 150 Cat 1 (reassuring)  TOCO:  Q 5-10 minutes    Physical Exam:   Constitutional: She is oriented to person, place, and time. She appears well-developed and well-nourished. No distress.    HENT:   Head: Normocephalic and atraumatic.     Neck: Normal range of motion. Neck supple.    Cardiovascular: Normal rate and normal heart sounds.  Exam reveals no gallop, no friction rub and no edema.    No murmur heard.   Pulmonary/Chest: Effort normal. No respiratory distress.        Abdominal: Soft. Bowel sounds are normal. She exhibits no distension and no mass. There is no tenderness. There is no guarding.     Genitourinary:   Genitourinary Comments: Uterus size equal to dates.            Musculoskeletal: Normal range of motion.       Neurological: She is alert and oriented to person, place, and time.    Skin: Skin is warm and dry. No pallor.    Psychiatric: She has a normal mood and affect. Her behavior is normal.       Cervix:  Dilation:  2  Effacement:  50%  Station: -2  Presentation: Vertex     Significant Labs:  Lab Results   Component Value Date    GROUPH A POS 04/04/2013    HEPBSAG NR 01/09/2017       I have personallly reviewed all pertinent lab results from the last 24 hours.

## 2017-08-17 NOTE — TREATMENT PLAN
Pt presents to unit with c/o vaginal bleeding and lower abdominal cramping. Pt stated she had sex before she started bleeding and hurting. EFMx2 placed, positive FHT noted. SVE done, see charting. VSS. PO hydration given to pt, verbalizes understanding. POC reviewed with pt, verbalizes understanding.

## 2017-08-17 NOTE — HOSPITAL COURSE
Pt had sex  and started having contractions. Chart review showed HIV positive status, without treatment and [facility]  Pt was taken to OR on 2017 for primary  delivery. She was started on combivir and kaletra.     Infant started on AZT twice a day    Postpartum course was benign.  She is bottle-feeding well.  Exam was benign with patient afebrile, vitals stable, and minimal bleeding.  Normal activities.      Social Service consult evaluation:    SW Supervisor met with mom to plan for discharge and follow-up.  Mom has been diagnosed HIV + but states that she did not know prior to hospitalization.  Mom's family is not aware of diagnosis.  Referring infant to the [facility] program.  Offered mom the option to follow-up for her own care at the [facility] or at [facility] with her baby.  She opted to follow-up at the [facility].  Mom states that she has no problems with transportation (though if transportation problems emerge, [facility] provides transportation).  Gave mom [facility]'s services and FACES program information. Had mom sign consent forms for referral and release of information to [facility].  Will fax information to [facility] and have left voicemail for their intake representative to call back to finalize referral.  Mom's partner was not at bedside.  SW Supervisor stressed that he needs testing as well and [facility] will be able to do that.  Mom verbalized understanding.     Mom is not involved at [facility], so also provided her information for [facility] clinics and services, though stressed that she is not to breastfeed pt.  Mom verbalized understanding.  Also stressed that she needs to be diligent in taking her medication  and giving infant his medication to hopefully prevent infection.  Also stressed that she needs to put their health first and foremost and follow-up with physicians as they set-up care to manage HIV.  Mom  verbalized understanding.  Also gave her information on diaper bank, \[facility]\"" health plans, immunizations, etc.   All information placed in blue folder.      Patient discharged home on postpartum day #3, 8/20/2017   Discharge medications include Percocet, Motrin, prenatal vitamins, and iron supplement.  Prescription for fowhvyx-vbdg-bhymcs-tenofo ala [id number] mg Tab sent to pharmacy    Our nurse has called the pharmacy to make sure they have the medication in stock for her and her baby.    Follow-up with Dr Chan next week for dressing removal.

## 2017-08-17 NOTE — TRANSFER OF CARE
"Anesthesia Transfer of Care Note    Patient: Bonnie Mcdonald    Procedure(s) Performed: Procedure(s) (LRB):  DELIVERY- SECTION (Bilateral)    Patient location: Labor and Delivery    Anesthesia Type: spinal    Transport from OR: Transported from OR on room air with adequate spontaneous ventilation    Post pain: adequate analgesia    Post assessment: no apparent anesthetic complications and tolerated procedure well    Post vital signs: stable    Level of consciousness: awake, alert and oriented    Nausea/Vomiting: no nausea/vomiting    Complications: none    Transfer of care protocol was followed      Last vitals:   Visit Vitals  BP (!) 102/45 (BP Location: Left arm, Patient Position: Lying)   Pulse (!) 112   Temp 37 °C (98.6 °F) (Oral)   Resp 18   Ht 5' 6" (1.676 m)   Wt 57.6 kg (127 lb)   LMP 2016   SpO2 100%   Breastfeeding? No   BMI 20.50 kg/m²     "

## 2017-08-17 NOTE — NURSING
Dr Hdz requested possible appt with MFM to discuss prenatal care/HIV. If MFM not available and patient is no longer in labor she can be discharged home.

## 2017-08-17 NOTE — CARE UPDATE
Discussed with patient her diagnosis of HIV.   Pt was unaware of her diagnosis.   I discussed recommendation for C/S in patients with unknown viral load to decrease transmission rates to infant.     Recommendations:   Delivery before onset of labor at 38 weeks.   Will need IV Zidovudine 2mg/kg for 1 hour then 1mg/kg for 2 hours prior to delivery.   Discussed with M recommended delivery today.   Start combivir and kaletra.           Jocelyn Chan MD

## 2017-08-17 NOTE — TREATMENT PLAN
Notified and spoke with Dr. Hdz of pts complaints and status. Ordered to start IV with fluids and give pain medication, see orders. Recheck cervix in a few hours.

## 2017-08-17 NOTE — NURSING
Called Dr Hdz after discussing patient with Dr. Vo and Dr. Chan. Dr. Chan requested to consult with patient and Dr. Hdz was happy to transfer care. Orders received from Dr. Chan, please check orders.

## 2017-08-18 PROBLEM — Z34.90 PREGNANCY WITH ONE FETUS: Status: RESOLVED | Noted: 2017-08-17 | Resolved: 2017-08-18

## 2017-08-18 LAB
BASOPHILS # BLD AUTO: 0.01 K/UL
BASOPHILS NFR BLD: 0.1 %
CD3+CD4+ CELLS # BLD: 500 CELLS/UL (ref 300–1400)
CD3+CD4+ CELLS NFR BLD: 25.3 % (ref 28–57)
DIFFERENTIAL METHOD: ABNORMAL
EOSINOPHIL # BLD AUTO: 0 K/UL
EOSINOPHIL NFR BLD: 0.1 %
ERYTHROCYTE [DISTWIDTH] IN BLOOD BY AUTOMATED COUNT: 11.9 %
HCT VFR BLD AUTO: 25.1 %
HGB BLD-MCNC: 8.4 G/DL
LYMPHOCYTES # BLD AUTO: 2.3 K/UL
LYMPHOCYTES NFR BLD: 21.2 %
MCH RBC QN AUTO: 29.3 PG
MCHC RBC AUTO-ENTMCNC: 33.5 G/DL
MCV RBC AUTO: 88 FL
MONOCYTES # BLD AUTO: 0.8 K/UL
MONOCYTES NFR BLD: 7.7 %
NEUTROPHILS # BLD AUTO: 7.8 K/UL
NEUTROPHILS NFR BLD: 70.6 %
PLATELET # BLD AUTO: 342 K/UL
PMV BLD AUTO: 8.7 FL
RBC # BLD AUTO: 2.87 M/UL
WBC # BLD AUTO: 10.97 K/UL

## 2017-08-18 PROCEDURE — 36415 COLL VENOUS BLD VENIPUNCTURE: CPT

## 2017-08-18 PROCEDURE — 25000003 PHARM REV CODE 250: Performed by: OBSTETRICS & GYNECOLOGY

## 2017-08-18 PROCEDURE — 85025 COMPLETE CBC W/AUTO DIFF WBC: CPT

## 2017-08-18 PROCEDURE — 87901 NFCT AGT GNTYP ALYS HIV1 REV: CPT

## 2017-08-18 PROCEDURE — 11000001 HC ACUTE MED/SURG PRIVATE ROOM

## 2017-08-18 PROCEDURE — 99232 SBSQ HOSP IP/OBS MODERATE 35: CPT | Mod: ,,, | Performed by: OBSTETRICS & GYNECOLOGY

## 2017-08-18 PROCEDURE — 63600175 PHARM REV CODE 636 W HCPCS: Performed by: OBSTETRICS & GYNECOLOGY

## 2017-08-18 PROCEDURE — 87906 NFCT AGT GNTYP ALYS HIV1: CPT

## 2017-08-18 RX ORDER — KETOROLAC TROMETHAMINE 30 MG/ML
30 INJECTION, SOLUTION INTRAMUSCULAR; INTRAVENOUS EVERY 6 HOURS
Status: DISCONTINUED | OUTPATIENT
Start: 2017-08-18 | End: 2017-08-18

## 2017-08-18 RX ORDER — EMTRICITABINE 200 MG/1
200 CAPSULE ORAL DAILY
Status: DISCONTINUED | OUTPATIENT
Start: 2017-08-18 | End: 2017-08-18

## 2017-08-18 RX ORDER — IBUPROFEN 400 MG/1
800 TABLET ORAL EVERY 8 HOURS
Status: DISCONTINUED | OUTPATIENT
Start: 2017-08-19 | End: 2017-08-18

## 2017-08-18 RX ORDER — HYDROMORPHONE HYDROCHLORIDE 2 MG/ML
1 INJECTION, SOLUTION INTRAMUSCULAR; INTRAVENOUS; SUBCUTANEOUS EVERY 4 HOURS PRN
Status: DISCONTINUED | OUTPATIENT
Start: 2017-08-18 | End: 2017-08-20 | Stop reason: HOSPADM

## 2017-08-18 RX ORDER — IBUPROFEN 400 MG/1
800 TABLET ORAL 3 TIMES DAILY
Status: DISCONTINUED | OUTPATIENT
Start: 2017-08-18 | End: 2017-08-20 | Stop reason: HOSPADM

## 2017-08-18 RX ADMIN — OXYCODONE HYDROCHLORIDE AND ACETAMINOPHEN 1 TABLET: 10; 325 TABLET ORAL at 09:08

## 2017-08-18 RX ADMIN — LOPINAVIR AND RITONAVIR 2 TABLET: 200; 50 TABLET, FILM COATED ORAL at 09:08

## 2017-08-18 RX ADMIN — IBUPROFEN 800 MG: 400 TABLET, FILM COATED ORAL at 06:08

## 2017-08-18 RX ADMIN — SODIUM CHLORIDE, SODIUM LACTATE, POTASSIUM CHLORIDE, AND CALCIUM CHLORIDE: .6; .31; .03; .02 INJECTION, SOLUTION INTRAVENOUS at 05:08

## 2017-08-18 RX ADMIN — OXYCODONE HYDROCHLORIDE AND ACETAMINOPHEN 1 TABLET: 10; 325 TABLET ORAL at 07:08

## 2017-08-18 RX ADMIN — DIPHENHYDRAMINE HYDROCHLORIDE 25 MG: 25 CAPSULE ORAL at 12:08

## 2017-08-18 RX ADMIN — DIPHENHYDRAMINE HYDROCHLORIDE 25 MG: 25 CAPSULE ORAL at 09:08

## 2017-08-18 RX ADMIN — LAMIVUDINE AND ZIDOVUDINE 1 TABLET: 150; 300 TABLET, FILM COATED ORAL at 09:08

## 2017-08-18 RX ADMIN — KETOROLAC TROMETHAMINE 30 MG: 30 INJECTION, SOLUTION INTRAMUSCULAR at 12:08

## 2017-08-18 RX ADMIN — KETOROLAC TROMETHAMINE 30 MG: 30 INJECTION, SOLUTION INTRAMUSCULAR at 05:08

## 2017-08-18 NOTE — CONSULTS
Consult Note  Infectious Disease    Consult Requested By: Jocelyn Chan MD    Reason for Consult: hiv positive test    SUBJECTIVE:     History of Present Illness:  Patient is a 22 y.o. female presents with a need for delivery of a baby. She was pregnant in January of 2017 and had a positive screen for hiv with a positive viral load of 963. She claims not to have knowledge of that but records indicate a letter and telephone call to that effect were made. Either way she did not have HAART instituted prior to this admit. She was delivered of a baby by [facility] and is NOT breast feeding. She has been placed on combivir and kaletra pending her cd4 count and hiv vl and genotype. She denies fever, weight loss or sweats.her partner is in the room and claims he is hiv negative. I have asked that he get tested and retested if current test is negative. Baby is on azt prophylaxis. History reviewed. No pertinent past medical history.  Past Surgical History:   Procedure Laterality Date    miscarriage        Family History   Problem Relation Age of Onset    Breast cancer Neg Hx     Colon cancer Neg Hx     Ovarian cancer Neg Hx      Social History   Substance Use Topics    Smoking status: Never Smoker    Smokeless tobacco: Not on file    Alcohol use No       Review of patient's allergies indicates:  No Known Allergies     Antibiotics     None          Review of Systems:  Constitutional: no fever or chills  Eyes: no visual changes  ENT: no nasal congestion or sore throat  Respiratory: no cough or shortness of breath  Cardiovascular: no chest pain or palpitations  Gastrointestinal: no nausea or vomiting, no abdominal pain or change in bowel habits  Genitourinary: no hematuria or dysuria  Integument/Breast: no rash or pruritis  Musculoskeletal: no arthralgias or myalgias  Neurological: no seizures or tremors    OBJECTIVE:     Vital Signs (Most Recent)  Temp: 97.4 °F (36.3 °C) (08/18/17 0755)  Pulse: 85 (08/18/17 0755)  Resp: 18  (08/18/17 0755)  BP: (!) 118/58 (08/18/17 0755)  SpO2: 100 % (08/17/17 1947)    Temperature Range Min/Max (Last 24H):  Temp:  [96.7 °F (35.9 °C)-98.6 °F (37 °C)]     Physical Exam:  General: well developed, well nourished  Eyes: conjunctivae/corneas clear. PERRL..  HENT: Head:normocephalic, atraumatic. Ears:not examined. Nose: Nares normal. Septum midline. Mucosa normal. No drainage or sinus tenderness., no discharge. Throat: lips, mucosa, and tongue normal; teeth and gums normal and no throat erythema.  Neck: supple, symmetrical, trachea midline, no JVD and thyroid not enlarged, symmetric, no tenderness/mass/nodules  Lungs:  clear to auscultation bilaterally and normal respiratory effort  Cardiovascular: Heart: regular rate and rhythm, S1, S2 normal, no murmur, click, rub or gallop. Chest Wall: no tenderness. Extremities: no cyanosis or edema, or clubbing. Pulses: 2+ and symmetric.  Abdomen/Rectal: Abdomen: soft, non-tender non-distented; bowel sounds normal; no masses,  no organomegaly. Rectal: not examined  Skin: Skin color, texture, turgor normal. No rashes or lesions  Musculoskeletal:no clubbing, cyanosis  Lymph Nodes: No cervical or supraclavicular adenopathy    Laboratory:  CBC    Recent Labs  Lab 08/18/17  0558   WBC 10.97   RBC 2.87*   HGB 8.4*   HCT 25.1*        BMP  No results for input(s): GLUCOSE, CO2, BUN, CREATININE, CALCIUM in the last 24 hours.    Invalid input(s): SODIUM, POTASSIUM, CHLORIDE  No results for input(s): COLORU, CLARITYU, SPECGRAV, PHUR, PROTEINUA, GLUCOSEU, BILIRUBINCON, BLOODU, WBCU, RBCU, BACTERIA, MUCUS, NITRITE, LEUKOCYTESUR, UROBILINOGEN, HYALINECASTS in the last 168 hours.  Microbiology Results (last 7 days)     ** No results found for the last 168 hours. **          Diagnostic Results:  Labs: Reviewed    ASSESSMENT/PLAN:     Active Hospital Problems    Diagnosis  POA    *HIV disease affecting pregnancy in third trimester, antepartum [O98.713]  Yes    Non-compliance  [Z91.19]  Not Applicable    Insufficient prenatal care in third trimester [O09.33]  Not Applicable    Status post primary low transverse  section [Z98.891]  Not Applicable      Resolved Hospital Problems    Diagnosis Date Resolved POA    Pregnancy with one fetus [Z34.90] 2017 Not Applicable    Pregnancy with one fetus [Z34.90] 2017 Not Applicable       1.hiv positive  Untreated antenatally  No breast feeding condom use and safe sex explained to her   She desires tubal ligation and will have that done soon  Genotype  Change regimen to genvoya to encourage compliance  Partner counselled  2. Screened negative for hep b and c and syphilis on admit  outpatient fu with [facility] health care

## 2017-08-18 NOTE — ASSESSMENT & PLAN NOTE
On Combivir and Kaletra, started yesterday.   Consult infectious disease today.   Will need to get set up with a clinic for management.

## 2017-08-18 NOTE — SUBJECTIVE & OBJECTIVE
Hospital course: Pt had sex  and started having contractions. Chart review showed HIV positive status, without treatment and viral load of 963 in [facility] 2017.  Pt was taken to OR on 2017 for primary  delivery. She was started on combivir and kaletra.     Interval History:   She is doing well this morning. She is tolerating a regular diet without nausea or vomiting. She is voiding spontaneously. She is ambulating. She has not passed flatus, and has not a BM. Vaginal bleeding is mild. She denies fever or chills. Abdominal pain is mild and controlled with oral medications. She is not breastfeeding. She desires circumcision for her male baby: not addressed at this time.    Objective:     Vital Signs (Most Recent):  Temp: 97.4 °F (36.3 °C) (17 0755)  Pulse: 85 (17 075)  Resp: 18 (17)  BP: (!) 118/58 (17)  SpO2: 100 % (17) Vital Signs (24h Range):  Temp:  [96.7 °F (35.9 °C)-98.6 °F (37 °C)] 97.4 °F (36.3 °C)  Pulse:  [] 85  Resp:  [10-19] 18  SpO2:  [98 %-100 %] 100 %  BP: ()/(45-69) 118/58     Weight: 57.6 kg (127 lb)  Body mass index is 20.5 kg/m².      Intake/Output Summary (Last 24 hours) at 17 0829  Last data filed at 17 0600   Gross per 24 hour   Intake             1960 ml   Output             2140 ml   Net             -180 ml       Significant Labs:  Lab Results   Component Value Date    GROUPTRH A POS 2017    HEPBSAG NR 2017       Recent Labs  Lab 17  0558   HGB 8.4*   HCT 25.1*       I have personallly reviewed all pertinent lab results from the last 24 hours.    Physical Exam:   Constitutional: She is oriented to person, place, and time. She appears well-developed and well-nourished. No distress.    HENT:   Head: Normocephalic and atraumatic.    Eyes: Conjunctivae and EOM are normal.    Neck: Normal range of motion. Neck supple.    Cardiovascular: Normal rate, regular rhythm and normal heart sounds.  Exam  reveals no clubbing, no cyanosis and no edema.    No murmur heard.   Pulmonary/Chest: Effort normal and breath sounds normal. No respiratory distress. She has no wheezes. She has no rales.        Abdominal: Soft. Normal appearance and bowel sounds are normal. She exhibits no distension and no mass. There is no tenderness. There is no rigidity, no rebound and no guarding.   Uterine fundus firm, below the umbilicus.             Musculoskeletal: Moves all extremeties. She exhibits no edema or tenderness.       Neurological: She is alert and oriented to person, place, and time.    Skin: Skin is warm and dry. No rash noted. No cyanosis. Nails show no clubbing.    Psychiatric: She has a normal mood and affect. Her behavior is normal.

## 2017-08-18 NOTE — PLAN OF CARE
Problem: Patient Care Overview  Goal: Individualization & Mutuality  Outcome: Ongoing (interventions implemented as appropriate)  VSS. Afebrile. LTV with aquacel dressing cd&i. [place] draining cyu and to be discontinued this AM. PCA discontinued this AM and Toradol started per order. Combivir and Kaletra started and given as ordered. Tolerated well. RAC infusing with LR @ 125ml/hr. POC discussed with patient and understanding voiced and with good recall. BRIANA

## 2017-08-18 NOTE — PROGRESS NOTES
[facility]  Obstetrics  Postpartum Progress Note    Patient Name: Bonnie Mcdonald  MRN: [id number]  Admission Date: 2017  Hospital Length of Stay: 1 days  Attending Physician: Jocelyn Chan MD  Primary Care Provider: Primary Doctor No    Subjective:     Principal Problem:HIV disease affecting pregnancy in third trimester, antepartum    Hospital course: Pt had sex  and started having contractions. Chart review showed HIV positive status, without treatment and viral load of 963 in Jaunuary 2017.  Pt was taken to OR on 2017 for primary  delivery. She was started on combivir and kaletra.     Interval History:   She is doing well this morning. She is tolerating a regular diet without nausea or vomiting. She is voiding spontaneously. She is ambulating. She has not passed flatus, and has not a BM. Vaginal bleeding is mild. She denies fever or chills. Abdominal pain is mild and controlled with oral medications. She is not breastfeeding. She desires circumcision for her male baby: not addressed at this time.    Objective:     Vital Signs (Most Recent):  Temp: 97.4 °F (36.3 °C) (17 0755)  Pulse: 85 (17 0755)  Resp: 18 (17)  BP: (!) 118/58 (17)  SpO2: 100 % (17) Vital Signs (24h Range):  Temp:  [96.7 °F (35.9 °C)-98.6 °F (37 °C)] 97.4 °F (36.3 °C)  Pulse:  [] 85  Resp:  [10-19] 18  SpO2:  [98 %-100 %] 100 %  BP: ()/(45-69) 118/58     Weight: 57.6 kg (127 lb)  Body mass index is 20.5 kg/m².      Intake/Output Summary (Last 24 hours) at 17 0829  Last data filed at 17 0600   Gross per 24 hour   Intake             1960 ml   Output             2140 ml   Net             -180 ml       Significant Labs:  Lab Results   Component Value Date    GROUPTRH A POS 2017    HEPBSAG NR 2017       Recent Labs  Lab 17  0558   HGB 8.4*   HCT 25.1*       I have personallly reviewed all pertinent lab results from the last 24  hours.    Physical Exam:   Constitutional: She is oriented to person, place, and time. She appears well-developed and well-nourished. No distress.    HENT:   Head: Normocephalic and atraumatic.    Eyes: Conjunctivae and EOM are normal.    Neck: Normal range of motion. Neck supple.    Cardiovascular: Normal rate, regular rhythm and normal heart sounds.  Exam reveals no clubbing, no cyanosis and no edema.    No murmur heard.   Pulmonary/Chest: Effort normal and breath sounds normal. No respiratory distress. She has no wheezes. She has no rales.        Abdominal: Soft. Normal appearance and bowel sounds are normal. She exhibits no distension and no mass. There is no tenderness. There is no rigidity, no rebound and no guarding.   Uterine fundus firm, below the umbilicus.             Musculoskeletal: Moves all extremeties. She exhibits no edema or tenderness.       Neurological: She is alert and oriented to person, place, and time.    Skin: Skin is warm and dry. No rash noted. No cyanosis. Nails show no clubbing.    Psychiatric: She has a normal mood and affect. Her behavior is normal.       Assessment/Plan:     22 y.o. female  for:    Status post primary low transverse  section    Doing well s/p delivery  Monitor bleeding.   Encourage ambulation.   Routine pain management.   Pt can not breastfeed due to HIV status.         * HIV disease affecting pregnancy in third trimester, antepartum    On Combivir and Kaletra, started yesterday.   Consult infectious disease today.   Will need to get set up with a clinic for management.               Disposition: As patient meets milestones, will plan to discharge POD#3-4.    Jocelyn Chan MD  Obstetrics  [facility]

## 2017-08-18 NOTE — PLAN OF CARE
Problem: Patient Care Overview  Goal: Plan of Care Review  Outcome: Ongoing (interventions implemented as appropriate)  VSS. Fundas and lochia WNL.  Ambulating. Voiding. Tolerating diet.  Passing flatus.  Pain meds PRN and toradol every 6 hours. ID consulted and spoke with patient this morning; new medications ordered and blood work.  consulted, due to see patient.

## 2017-08-18 NOTE — CONSULTS
SW Supervisor met with pt to plan for discharge and follow-up.  Pt has been diagnosed HIV + but states that she did not know prior to hospitalization.  Pt's family is not aware of diagnosis.  Referring infant to the [facility] program.  Offered pt the option to follow-up for her own care at the [facility] clinic or at [facility] with her baby.  She opted to follow-up at the [facility].  Mom states that she has no problems with transportation (though if transportation problems emerge, [facility] provides transportation).  Gave mom [facility] services and FACES program information. Had pt sign consent forms for referral and release of information to [facility].  Will fax information to [facility] and have left voicemail for their intake representative to call back to finalize referral.  Pt's partner was not at bedside.  SW Supervisor stressed that he needs testing as well and [facility] will be able to do that.  Mom verbalized understanding.    Mom is not involved at [facility], so also provided her information for [facility] clinics and services, though stressed that she is not to breastfeed pt.  Mom verbalized understanding.  Also stressed that she needs to be diligent in taking her medication  and giving infant his medication to hopefully prevent infection.  Also stressed that she needs to put their health first and foremost and follow-up with physicians as they set-up care to manage HIV.  Mom verbalized understanding.  Also gave her information on [facility], [place] [facility] [place], immunizations, etc.   All information placed in blue folder.

## 2017-08-18 NOTE — ASSESSMENT & PLAN NOTE
Doing well s/p delivery  Monitor bleeding.   Encourage ambulation.   Routine pain management.   Pt can not breastfeed due to HIV status.

## 2017-08-18 NOTE — PROGRESS NOTES
Patient to PP room 242 via stretcher accompanied by ACollettiRN. VSS. POC discussed with patient. Questions encouraged. Discuss use of PCA pump and encouraged use to control pain. Patient denies any questions. TERRY. Will monitor.

## 2017-08-18 NOTE — PHYSICIAN QUERY
PT Name: Bonnie Mcdonald  MR #: [id number]    Physician Query Form - HIV Clarification     CDS/: Stephania Silva RN-BSN      Contact information:[phone]     This form is a permanent document in the medical record.     Query Date: August 18, 2017      By submitting this query, we are merely seeking further clarification of documentation. Please utilize your independent clinical judgment when addressing the question(s) below.    The Medical record contains the following:   Indicators   Supporting Clinical Findings Location in Medical Record   X HIV or AIDS HIV disease affecting pregnancy in third trimester, antepartum H&P 8/17   X CD4 Count          CD4%        Viral Load HIV without treatment and unknown viral load. Viral load in January was 960.  H&P 8/17    History of Opportunistic Infection      Cancer  Pneumocystis Pneumonia (PCP)  Cytomegalovirus (CMV)  Kaposi Sarcoma      HIV-Related Conditions (e.g. Dementia, Encephalopathy) documented     X Medications Start AZT at 2mg/kg loading dose then AZT 1mg/kg/hr for 3 hours.   Plan for delivery at 3 hours after starting AZT.   Delivery today due to unknown viral load and contractions.   Start combivir and kaletra after delivery.  H&P 8/17   X Other Technical Procedures Used: Primary C/S delivery L&D Delivery note 8/17     Please clarify the patients HIV status  Per CDC publication Vol 60 RR-17 https://www.cdc.gov/mmwr/preview/mmwrhtml/nb6846z6.htmRelating to the classification HIV Infection, once a patient is diagnosed with AIDS the diagnosis still stands even if, after treatment, the CD4+ T cell count rises to above 200 per ìL of blood or other AIDS-defining illnesses are cured.       The following are AIDS-Defining Illnesses or HIV-Related Diseases.  Bacterial infections, multiple or recurrent only in children aged <6 years Kaposi sarcoma   Candidiasis of bronchi, trachea, or lungs Lymphoma, Burkitt (or equivalent term)   Candidiasis of esophagus  Lymphoma, immunoblastic (or equivalent term)   Cervical cancer, invasive Only among adults, adolescents, and children aged > 6 years. Lymphoma, primary, of brain   Coccidioidomycosis, disseminated or extrapulmonary Mycobacterium avium complex or Mycobacterium kansasii, disseminated or extrapulmonary   Cryptococcosis, extrapulmonary Mycobacterium tuberculosis of any site, pulmonary, disseminated, or extrapulmonary   Cryptosporidiosis, chronic intestinal (>1 months duration) Mycobacterium, other species or unidentified species, disseminated or extrapulmonary   Cytomegalovirus disease (other than liver, spleen, or nodes), onset at age >1 month Pneumocystis jirovecii (previously known as Pneumocystis carinii) pneumonia   Cytomegalovirus retinitis (with loss of vision) Pneumonia, recurrent Only among adults, adolescents, and children aged .6 years.   Encephalopathy attributed to HIV Progressive multifocal leukoencephalopathy   Herpes simplex: chronic ulcers (>1 months duration) or bronchitis, pneumonitis, or esophagitis (onset at age >1 month) Salmonella septicemia, recurrent   Histoplasmosis, disseminated or extrapulmonary Toxoplasmosis of brain, onset at age >1 month   Isosporiasis, chronic intestinal (>1 months duration) Wasting syndrome attributed to HIV     [ ] Asymptomatic HIV Infection - Positive Status Only - without any history of (or current) AIDS-Defining Illness or HIV-Related Illness    [ ] HIV Disease / AIDS - Meets the current CDC Definition of AIDS: HIV-infected persons who have less than 200 CD4+ T-lymphocytes/uL, or CD4+ T-lymphocyte percentage of total lymphocytes of less than 14, and/or an AIDS-Defining Illness or HIV-Related Disease (see above).    [ ] Patient is HIV Negative  [ ] Other (please specify): ____________________________________  [ x ] Clinically Undetermined    Please document in your progress notes daily for the duration of treatment until resolved and include in your discharge  summary.

## 2017-08-19 PROCEDURE — 25000003 PHARM REV CODE 250: Performed by: OBSTETRICS & GYNECOLOGY

## 2017-08-19 PROCEDURE — 11000001 HC ACUTE MED/SURG PRIVATE ROOM

## 2017-08-19 PROCEDURE — 25000003 PHARM REV CODE 250: Performed by: INTERNAL MEDICINE

## 2017-08-19 PROCEDURE — 99232 SBSQ HOSP IP/OBS MODERATE 35: CPT | Mod: ,,, | Performed by: OBSTETRICS & GYNECOLOGY

## 2017-08-19 RX ADMIN — OXYCODONE HYDROCHLORIDE AND ACETAMINOPHEN 1 TABLET: 10; 325 TABLET ORAL at 06:08

## 2017-08-19 RX ADMIN — DIPHENHYDRAMINE HYDROCHLORIDE 25 MG: 25 CAPSULE ORAL at 09:08

## 2017-08-19 RX ADMIN — IBUPROFEN 800 MG: 400 TABLET, FILM COATED ORAL at 06:08

## 2017-08-19 RX ADMIN — DIPHENHYDRAMINE HYDROCHLORIDE 25 MG: 25 CAPSULE ORAL at 08:08

## 2017-08-19 RX ADMIN — OXYCODONE HYDROCHLORIDE AND ACETAMINOPHEN 1 TABLET: 10; 325 TABLET ORAL at 05:08

## 2017-08-19 RX ADMIN — IBUPROFEN 800 MG: 400 TABLET, FILM COATED ORAL at 09:08

## 2017-08-19 RX ADMIN — ELVITEGRAVIR, COBICISTAT, EMTRICITABINE, AND TENOFOVIR ALAFENAMIDE 1 TABLET: 150; 150; 200; 10 TABLET ORAL at 08:08

## 2017-08-19 NOTE — SUBJECTIVE & OBJECTIVE
Hospital course: Pt had sex  and started having contractions. Chart review showed HIV positive status, without treatment and viral load of 963 in [facility] 2017.  Pt was taken to OR on 2017 for primary  delivery. She was started on combivir and kaletra.     Interval History:     She is doing well this morning. She is tolerating a regular diet without nausea or vomiting. She is voiding spontaneously. She is ambulating. She has passed flatus, and has a BM. Vaginal bleeding is mild. She denies fever or chills. Abdominal pain is mild and controlled with oral medications. She is not breastfeeding. She desires circumcision for her male baby: no.    Objective:     Vital Signs (Most Recent):  Temp: 96.4 °F (35.8 °C) (17)  Pulse: 84 (17)  Resp: 18 (17)  BP: 122/69 (17)  SpO2: 100 % (17) Vital Signs (24h Range):  Temp:  [96.4 °F (35.8 °C)-97.7 °F (36.5 °C)] 96.4 °F (35.8 °C)  Pulse:  [81-96] 84  Resp:  [18] 18  BP: ()/(53-69) 122/69     Weight: 57.6 kg (127 lb)  Body mass index is 20.5 kg/m².      Intake/Output Summary (Last 24 hours) at 17 0906  Last data filed at 17 0605   Gross per 24 hour   Intake             1320 ml   Output             2300 ml   Net             -980 ml       Significant Labs:  Lab Results   Component Value Date    GROUPTRH A POS 2017    HEPBSAG NR 2017       Recent Labs  Lab 17  0558   HGB 8.4*   HCT 25.1*       CBC:   Recent Labs  Lab 17  0558   WBC 10.97   RBC 2.87*   HGB 8.4*   HCT 25.1*      MCV 88   MCH 29.3   MCHC 33.5     I have personallly reviewed all pertinent lab results from the last 24 hours.    Physical Exam:   Constitutional: She appears well-developed and well-nourished. No distress.    HENT:   Head: Normocephalic and atraumatic.    Eyes: EOM are normal.    Neck: Normal range of motion.    Cardiovascular: Normal rate.     Pulmonary/Chest: Effort normal. No respiratory  distress.        Abdominal: Soft. She exhibits no distension. There is no tenderness. There is no rebound and no guarding.   Pfannenstiel incision in AquaCel dressing.  No evidence of active bleeding.                 Musculoskeletal: Normal range of motion.       Neurological: She is alert.    Skin: Skin is warm and dry.    Psychiatric: She has a normal mood and affect.

## 2017-08-19 NOTE — CONSULTS
Consult Note  Infectious Disease    Consult Requested By: Jocelyn Chan MD    Reason for Consult: hiv positive test    SUBJECTIVE:     History of Present Illness:  Patient is a 22 y.o. female presents with a need for delivery of a baby. She was pregnant in January of 2017 and had a positive screen for hiv with a positive viral load of 963. She claims not to have knowledge of that but records indicate a letter and telephone call to that effect were made. Either way she did not have HAART instituted prior to this admit. She was delivered of a baby by [facility] and is NOT breast feeding. She has been placed on combivir and kaletra pending her cd4 count and hiv vl and genotype. She denies fever, weight loss or sweats.her partner is in the room and claims he is hiv negative. I have asked that he get tested and retested if current test is negative. Baby is on azt. I changed to genvoya.  History reviewed. No pertinent past medical history.  Past Surgical History:   Procedure Laterality Date    miscarriage        Family History   Problem Relation Age of Onset    Breast cancer Neg Hx     Colon cancer Neg Hx     Ovarian cancer Neg Hx      Social History   Substance Use Topics    Smoking status: Never Smoker    Smokeless tobacco: Not on file    Alcohol use No       Review of patient's allergies indicates:  No Known Allergies     Antibiotics     None          Review of Systems:  Constitutional: no fever or chills  Eyes: no visual changes  ENT: no nasal congestion or sore throat  Respiratory: no cough or shortness of breath  Cardiovascular: no chest pain or palpitations  Gastrointestinal: no nausea or vomiting, no abdominal pain or change in bowel habits  Genitourinary: no hematuria or dysuria  Integument/Breast: no rash or pruritis  Musculoskeletal: no arthralgias or myalgias  Neurological: no seizures or tremors    OBJECTIVE:     Vital Signs (Most Recent)  Temp: 98 °F (36.7 °C) (08/19/17 1136)  Pulse: 78 (08/19/17 1136)  Resp:  18 (08/19/17 1136)  BP: (!) 109/55 (08/19/17 1136)  SpO2: 100 % (08/17/17 1947)    Temperature Range Min/Max (Last 24H):  Temp:  [96.4 °F (35.8 °C)-98 °F (36.7 °C)]     Physical Exam:  General: well developed, well nourished  Eyes: conjunctivae/corneas clear. PERRL..  HENT: Head:normocephalic, atraumatic. Ears:not examined. Nose: Nares normal. Septum midline. Mucosa normal. No drainage or sinus tenderness., no discharge. Throat: lips, mucosa, and tongue normal; teeth and gums normal and no throat erythema.  Neck: supple, symmetrical, trachea midline, no JVD and thyroid not enlarged, symmetric, no tenderness/mass/nodules  Lungs:  clear to auscultation bilaterally and normal respiratory effort  Cardiovascular: Heart: regular rate and rhythm, S1, S2 normal, no murmur, click, rub or gallop. Chest Wall: no tenderness. Extremities: no cyanosis or edema, or clubbing. Pulses: 2+ and symmetric.  Abdomen/Rectal: Abdomen: soft, non-tender non-distented; bowel sounds normal; no masses,  no organomegaly. Rectal: not examined  Skin: Skin color, texture, turgor normal. No rashes or lesions  Musculoskeletal:no clubbing, cyanosis  Lymph Nodes: No cervical or supraclavicular adenopathy    Laboratory:  CBC  No results for input(s): WBC, RBC, HGB, HCT, PLT in the last 24 hours.  BMP  No results for input(s): GLUCOSE, CO2, BUN, CREATININE, CALCIUM in the last 24 hours.    Invalid input(s): SODIUM, POTASSIUM, CHLORIDE  No results for input(s): COLORU, CLARITYU, SPECGRAV, PHUR, PROTEINUA, GLUCOSEU, BILIRUBINCON, BLOODU, WBCU, RBCU, BACTERIA, MUCUS, NITRITE, LEUKOCYTESUR, UROBILINOGEN, HYALINECASTS in the last 168 hours.  Microbiology Results (last 7 days)     ** No results found for the last 168 hours. **          Diagnostic Results:  Labs: Reviewed    ASSESSMENT/PLAN:     Active Hospital Problems    Diagnosis  POA    *HIV disease affecting pregnancy in third trimester, antepartum [O98.713]  Yes    Non-compliance [Z91.19]  Not  Applicable    Insufficient prenatal care in third trimester [O09.33]  Not Applicable    Status post primary low transverse  section [Z98.891]  Not Applicable    HIV (human immunodeficiency virus infection) [Z21]  Yes      Resolved Hospital Problems    Diagnosis Date Resolved POA    Pregnancy with one fetus [Z34.90] 2017 Not Applicable    Pregnancy with one fetus [Z34.90] 2017 Not Applicable       1.hiv positive  Untreated antenatally  No breast feeding condom use and safe sex explained to her   She desires tubal ligation and will have that done soon  Genotype  Change regimen to genvoya to encourage compliance  Partner counselled  2. Screened negative for hep b and c and syphilis on admit  outpatient fu with [facility] health care  I LEFT A RX FOR GENVOYA WITH RN AND ASKED CHARGE NURSE TO ENSURE PATIENT GETS IT ON DISCHARGE   ALSO TRACEY NEEDS TO LEAVE WITH FORMULA FOR BABY ON DC  SHE HAS NO FUNDS APPARENTLY AND WILL NOT GET TO WIC TILL Monday  SHE KNOWS NOT TO BREAST FEED CHILD BUT I AM AFRAID SHE WILL DO SO IF SHE DOES NOT HAVE FORMULA AT HOME AND HAS A HUNGRY INFANT

## 2017-08-19 NOTE — PROGRESS NOTES
[facility]  Obstetrics  Postpartum Progress Note    Patient Name: Bonnie Mcdonald  MRN: [id number]  Admission Date: 2017  Hospital Length of Stay: 2 days  Attending Physician: Jcoelyn Chan MD  Primary Care Provider: Primary Doctor No    Subjective:     Principal Problem:HIV disease affecting pregnancy in third trimester, antepartum    Hospital course: Pt had sex  and started having contractions. Chart review showed HIV positive status, without treatment and viral load of 963 in [place] 2017.  Pt was taken to OR on 2017 for primary  delivery. She was started on combivir and kaletra.     Interval History:     She is doing well this morning. She is tolerating a regular diet without nausea or vomiting. She is voiding spontaneously. She is ambulating. She has passed flatus, and has a BM. Vaginal bleeding is mild. She denies fever or chills. Abdominal pain is mild and controlled with oral medications. She is not breastfeeding. She desires circumcision for her male baby: no.    Objective:     Vital Signs (Most Recent):  Temp: 96.4 °F (35.8 °C) (17)  Pulse: 84 (17)  Resp: 18 (17)  BP: 122/69 (17)  SpO2: 100 % (17) Vital Signs (24h Range):  Temp:  [96.4 °F (35.8 °C)-97.7 °F (36.5 °C)] 96.4 °F (35.8 °C)  Pulse:  [81-96] 84  Resp:  [18] 18  BP: ()/(53-69) 122/69     Weight: 57.6 kg (127 lb)  Body mass index is 20.5 kg/m².      Intake/Output Summary (Last 24 hours) at 17 09  Last data filed at 17 0605   Gross per 24 hour   Intake             1320 ml   Output             2300 ml   Net             -980 ml       Significant Labs:  Lab Results   Component Value Date    GROUPTRH A POS 2017    HEPBSAG NR 2017       Recent Labs  Lab 17  0558   HGB 8.4*   HCT 25.1*       CBC:   Recent Labs  Lab 17  0558   WBC 10.97   RBC 2.87*   HGB 8.4*   HCT 25.1*      MCV 88   MCH 29.3   MCHC 33.5     I have  personallly reviewed all pertinent lab results from the last 24 hours.    Physical Exam:   Constitutional: She appears well-developed and well-nourished. No distress.    HENT:   Head: Normocephalic and atraumatic.    Eyes: EOM are normal.    Neck: Normal range of motion.    Cardiovascular: Normal rate.     Pulmonary/Chest: Effort normal. No respiratory distress.        Abdominal: Soft. She exhibits no distension. There is no tenderness. There is no rebound and no guarding.   Pfannenstiel incision in AquaCel dressing.  No evidence of active bleeding.                 Musculoskeletal: Normal range of motion.       Neurological: She is alert.    Skin: Skin is warm and dry.    Psychiatric: She has a normal mood and affect.       Assessment/Plan:     22 y.o. female  for:    Status post primary low transverse  section    Doing well s/p delivery  Monitor bleeding.   Encourage ambulation.   Routine pain management.   Pt can not breastfeed due to HIV status.     Planned discharge for tomorrow, 2017        Insufficient prenatal care in third trimester    Now status post  section        Non-compliance    Now status post  section        HIV (human immunodeficiency virus infection)    Now on Combivir and Kaletra        * HIV disease affecting pregnancy in third trimester, antepartum    Now status post  section  On Combivir and Kaletra, started 2017.   Dr cShneider has seen patient  Will need to get set up with a clinic for management for both mother and infant.               Disposition: As patient meets milestones, will plan to discharge home.    Pete Matthew MD  Obstetrics  [facility]

## 2017-08-19 NOTE — PLAN OF CARE
Problem: Patient Care Overview  Goal: Plan of Care Review  Outcome: Ongoing (interventions implemented as appropriate)  Pt is AAO and stable. Pain is controlled w/ PRN pain meds. Lochia and fundus WNL. Pt remains free of falls. Bed in lowest position. Call bell w/in reach. Pt instructed to call if assistance is needed. Will continue to monitor.

## 2017-08-19 NOTE — ASSESSMENT & PLAN NOTE
Now status post  section  On Combivir and Kaletra, started 2017.   Dr Schneider has seen patient  Will need to get set up with a clinic for management for both mother and infant.

## 2017-08-19 NOTE — ASSESSMENT & PLAN NOTE
Doing well s/p delivery  Monitor bleeding.   Encourage ambulation.   Routine pain management.   Pt can not breastfeed due to HIV status.     Planned discharge for tomorrow, 8/20/2017

## 2017-08-19 NOTE — NURSING
Dr. Schneider at bedside.  No new orders rec'd.   Placed hard rx in pt's chart for Genvoya, herself, and wants it given to pt prior to discharge.  States didn't want to give pt rx now, because she may lose it.

## 2017-08-20 VITALS
RESPIRATION RATE: 18 BRPM | TEMPERATURE: 97 F | SYSTOLIC BLOOD PRESSURE: 103 MMHG | OXYGEN SATURATION: 100 % | HEIGHT: 66 IN | HEART RATE: 71 BPM | BODY MASS INDEX: 20.41 KG/M2 | DIASTOLIC BLOOD PRESSURE: 57 MMHG | WEIGHT: 127 LBS

## 2017-08-20 PROCEDURE — 63600175 PHARM REV CODE 636 W HCPCS: Performed by: OBSTETRICS & GYNECOLOGY

## 2017-08-20 PROCEDURE — 90471 IMMUNIZATION ADMIN: CPT | Performed by: OBSTETRICS & GYNECOLOGY

## 2017-08-20 PROCEDURE — 99238 HOSP IP/OBS DSCHRG MGMT 30/<: CPT | Mod: ,,, | Performed by: OBSTETRICS & GYNECOLOGY

## 2017-08-20 PROCEDURE — 90715 TDAP VACCINE 7 YRS/> IM: CPT | Performed by: OBSTETRICS & GYNECOLOGY

## 2017-08-20 PROCEDURE — 25000003 PHARM REV CODE 250: Performed by: OBSTETRICS & GYNECOLOGY

## 2017-08-20 PROCEDURE — 25000003 PHARM REV CODE 250: Performed by: INTERNAL MEDICINE

## 2017-08-20 RX ORDER — IBUPROFEN 800 MG/1
800 TABLET ORAL 3 TIMES DAILY
Qty: 30 TABLET | Refills: 1 | Status: SHIPPED | OUTPATIENT
Start: 2017-08-20 | End: 2018-11-18

## 2017-08-20 RX ORDER — OXYCODONE AND ACETAMINOPHEN 5; 325 MG/1; MG/1
1 TABLET ORAL EVERY 4 HOURS PRN
Qty: 30 TABLET | Refills: 0 | Status: SHIPPED | OUTPATIENT
Start: 2017-08-20 | End: 2018-12-01 | Stop reason: ALTCHOICE

## 2017-08-20 RX ADMIN — OXYCODONE HYDROCHLORIDE AND ACETAMINOPHEN 1 TABLET: 10; 325 TABLET ORAL at 11:08

## 2017-08-20 RX ADMIN — IBUPROFEN 800 MG: 400 TABLET, FILM COATED ORAL at 05:08

## 2017-08-20 RX ADMIN — OXYCODONE HYDROCHLORIDE AND ACETAMINOPHEN 1 TABLET: 10; 325 TABLET ORAL at 02:08

## 2017-08-20 RX ADMIN — CLOSTRIDIUM TETANI TOXOID ANTIGEN (FORMALDEHYDE INACTIVATED), CORYNEBACTERIUM DIPHTHERIAE TOXOID ANTIGEN (FORMALDEHYDE INACTIVATED), BORDETELLA PERTUSSIS TOXOID ANTIGEN (GLUTARALDEHYDE INACTIVATED), BORDETELLA PERTUSSIS FILAMENTOUS HEMAGGLUTININ ANTIGEN (FORMALDEHYDE INACTIVATED), BORDETELLA PERTUSSIS PERTACTIN ANTIGEN, AND BORDETELLA PERTUSSIS FIMBRIAE 2/3 ANTIGEN 0.5 ML: 5; 2; 2.5; 5; 3; 5 INJECTION, SUSPENSION INTRAMUSCULAR at 05:08

## 2017-08-20 RX ADMIN — IBUPROFEN 800 MG: 400 TABLET, FILM COATED ORAL at 02:08

## 2017-08-20 RX ADMIN — ELVITEGRAVIR, COBICISTAT, EMTRICITABINE, AND TENOFOVIR ALAFENAMIDE 1 TABLET: 150; 150; 200; 10 TABLET ORAL at 08:08

## 2017-08-20 NOTE — DISCHARGE INSTRUCTIONS
After a Cesearean Birth    General Discharge Instructions  · May follow a regular diet, unless otherwise discussed with physician.  · Take showers, not baths unless otherwise discussed with physician.  · Activity as tolerated.  · No lifting or heavy exercise for 6 weeks, no driving for 2 weeks, no sexual intercourse, douching or tampons for 6 weeks  · May return to work/school as discussed with physician  · Discuss birth control with physician  · Breast care support bra worn at all times  · Lactation consultant referral number ( [phone] or [phone])    Call Your Healthcare Provider Right Away If You Have:  · A fever of 101°F or higher.  · Incisional drainage  · Heavy vaginal bleeding, clots, or vaginal discharge with foul odor.  · Redness, discharge, or pain worse than you had in the hospital.  · Burning, pain, red streaks, or lumpy areas in your breasts.  · Cracks, blisters, or blood on your nipples.  · Burning or pain when you urinate.  · Persistent nausea or vomiting.  · Severe headaches, blurred vision, dizziness or fainting.  · Feelings of extreme sadness or anxiety, or a feeling that you dont want to be with your baby.  · No bowel movement for 5 days.  · Redness, warmth, swelling, or pain in the lower leg.    Incision Care  · You will be able to shower and pat the incision dry.  · Watch your incision for signs of infection, such as increasing redness or drainage.  · For ease of movement, hold a pillow against the incision when you get up from a lying or sitting position, and when you laugh or cough.  · Avoid heavy lifting--nothing heavier than your baby until your doctor instructs you otherwise.     Follow-Up  Schedule a  follow-up exam with your healthcare provider for about 6 weeks after delivery. During this exam, your uterus and vaginal area will be checked. Contact your healthcare provider if you think you or your baby are having any problems.

## 2017-08-20 NOTE — ASSESSMENT & PLAN NOTE
Now status post  section  Prescription for gpswsmb-ybuf-toawaq-tenofo ala [phone] mg Tab sent to pharmacy    Our nurse has called the pharmacy to make sure they have the medication in stock for her and her baby.

## 2017-08-20 NOTE — CONSULTS
SW received consult for baby formula for patient.  IVORY contacted IVORY Supervisor for assistance on resources for the weekend.  IVORY directed to [facility].  IVORY called the after hours number [phone].   for answering service stated that she would have to take a message for tomorrow, as no one on call would be able to assist with this.  IVORY met with patient. She stated the nurse was able to provide her with formula to take home with baby today.

## 2017-08-20 NOTE — DISCHARGE SUMMARY
[facility]  Obstetrics  Discharge Summary      Patient Name: Bonnie Mcdonald  MRN: [id number]  Admission Date: 2017  Hospital Length of Stay: 3 days  Discharge Date and Time:  2017 10:33 AM  Attending Physician: Jocelyn Chan MD   Discharging Provider: Pete Matthew MD  Primary Care Provider: Primary Doctor Anya    HPI: Bonnie Mcdonald is a 22 y.o.  at 37w6d here for contractions. She has had no prenatal care, she has HIV.     Procedure(s) (LRB):  DELIVERY- SECTION (Bilateral)     Hospital Course:   Pt had sex  and started having contractions. Chart review showed HIV positive status, without treatment and viral load of 963 in Ja2017.  Pt was taken to OR on 2017 for primary  delivery. She was started on combivir and kaletra.     Infant started on AZT twice a day    Postpartum course was benign.  She is bottle-feeding well.  Exam was benign with patient afebrile, vitals stable, and minimal bleeding.  Normal activities.      Social Service consult evaluation:    SW Supervisor met with mom to plan for discharge and follow-up.  Mom has been diagnosed HIV + but states that she did not know prior to hospitalization.  Mom's family is not aware of diagnosis.  Referring infant to the [facility]'s F[facility] program.  Offered mom the option to follow-up for her own care at the [facility] clinic or at [facility] with her baby.  She opted to follow-up at the Care Center.  Mom states that she has no problems with transportation (though if transportation problems emerge, [facility] provides transportation).  [facility] information. Had mom sign consent forms for referral and release of information to [facility].  Will fax information to [facility] and have left voicemail for their intake representative to call back to finalize referral.  Mom's partner was not at bedside.  SW Supervisor stressed that he needs  testing as well and [facility] will be able to do that.  Mom verbalized understanding.     Mom is not involved at [facility], so also provided her information for [facility] clinics and services, though stressed that she is not to breastfeed pt.  Mom verbalized understanding.  Also stressed that she needs to be diligent in taking her medication  and giving infant his medication to hopefully prevent infection.  Also stressed that she needs to put their health first and foremost and follow-up with physicians as they set-up care to manage HIV.  Mom verbalized understanding.  Also gave her information on diaper bank, [facility] health plans, immunizations, etc.   All information placed in blue folder.      Patient discharged home on postpartum day #3, 2017   Discharge medications include Percocet, Motrin, prenatal vitamins, and iron supplement.  Prescription for oqwyacd-ppqg-pwosld-tenofo ala [phone] mg Tab sent to pharmacy    Our nurse has called the pharmacy to make sure they have the medication in stock for her and her baby.    Follow-up with Dr Kaur next week for dressing removal.      Consults         Status Ordering Provider     Inpatient consult to Anesthesiology  Once     Provider:  (Not yet assigned)    Acknowledged REESE KAUR     Inpatient consult to Infectious Diseases  Once     Provider:  (Not yet assigned)    Completed REESE KAUR     Inpatient consult to Social Work  Once     Provider:  (Not yet assigned)    Completed KARENA HANSEN     Inpatient consult to Social Work  Once     Provider:  (Not yet assigned)    Acknowledged KARENA HANSEN          Final Active Diagnoses:    Diagnosis Date Noted POA    PRINCIPAL PROBLEM:  Status post primary low transverse  section [Z98.891] 2017 Not Applicable    HIV disease affecting pregnancy in third trimester, antepartum [O98.713] 2017 Yes    Non-compliance [Z91.19] 2017 Not Applicable    Insufficient prenatal care in third trimester  [O09.33] 2017 Not Applicable    HIV (human immunodeficiency virus infection) [Z21] 2017 Yes    GBS bacteriuria [R82.71] 2017 Yes      Problems Resolved During this Admission:    Diagnosis Date Noted Date Resolved POA    Pregnancy with one fetus [Z34.90] 2017 Not Applicable    Pregnancy with one fetus [Z34.90] 2017 Not Applicable        Labs: CMP No results for input(s): NA, K, CL, CO2, GLU, BUN, CREATININE, CALCIUM, PROT, ALBUMIN, BILITOT, ALKPHOS, AST, ALT, ANIONGAP, ESTGFRAFRICA, EGFRNONAA in the last 48 hours., CBC No results for input(s): WBC, HGB, HCT, PLT in the last 48 hours. and All labs within the past 24 hours have been reviewed    Feeding Method: bottle    Immunizations     Date Immunization Status Dose Route/Site Given by    177 MMR Incomplete 0.5 mL Subcutaneous/Left deltoid     17 0545 Tdap Given 0.5 mL Intramuscular/Right deltoid Khalida Hernandez RN          Delivery:    Episiotomy: None   Lacerations: None   Repair suture: None   Repair # of packets:     Blood loss (ml): 0     Birth information:  YOB: 2019   Time of birth: 5:18 PM   Sex: male   Delivery type: , Low Transverse   Gestational Age: 37w6d    Delivery Clinician:      Other providers:       Additional  information:  Forceps:    Vacuum:    Breech:    Observed anomalies      Living?:           APGARS  One minute Five minutes Ten minutes   Skin color:         Heart rate:         Grimace:         Muscle tone:         Breathing:         Totals: 9  9        Placenta: Delivered:       appearance    Pending Diagnostic Studies:     Procedure Component Value Units Date/Time    HIV Phenotype [[id number]] Collected:  17    Order Status:  Sent Lab Status:  In process Updated:  17 123    Specimen:  Blood from Blood     HIV RNA, quantitative, PCR [[id number]] Collected:  17    Order Status:  Sent Lab Status:  In process Updated:   17 1235    Specimen:  Blood from Blood     HIV-1 GenotypR PLUS [[id number]] Collected:  17 1235    Order Status:  Sent Lab Status:  In process Updated:  17 1333    Specimen:  Blood from Blood     HIV-1 INTEGRASE GENOTYPE [[id number]] Collected:  17 1236    Order Status:  Sent Lab Status:  In process Updated:  17 1333    Specimen:  Blood from Blood           Discharged Condition: good    Disposition: Home or Self Care    Follow Up:  Follow-up Information     Jocelyn Chan MD In 1 week.    Specialty:  Obstetrics and Gynecology  Why:  dressing removal  Contact information:  [address] [place] [address]  [address]  [facility] [address]  [phone]                 Patient Instructions:     Diet general     Activity as tolerated     Call MD for:  temperature >100.4     Call MD for:  persistent nausea and vomiting or diarrhea     Call MD for:  severe uncontrolled pain     Call MD for:  redness, tenderness, or signs of infection (pain, swelling, redness, odor or green/yellow discharge around incision site)     Call MD for:  difficulty breathing or increased cough     Call MD for:  severe persistent headache     Call MD for:  worsening rash     Call MD for:  persistent dizziness, light-headedness, or visual disturbances     Call MD for:  increased confusion or weakness     No dressing needed       Medications:  Current Discharge Medication List      START taking these medications    Details   kkvzdeq-npro-bvbknn-tenofo ala [phone] mg Tab Take 1 tablet by mouth once daily.  Qty: 30 tablet, Refills: 1    Associated Diagnoses: HIV (human immunodeficiency virus infection)      ibuprofen (ADVIL,MOTRIN) 800 MG tablet Take 1 tablet (800 mg total) by mouth 3 (three) times daily.  Qty: 30 tablet, Refills: 1    Associated Diagnoses: Status post primary low transverse  section      oxycodone-acetaminophen (PERCOCET) 5-325 mg per tablet Take 1 tablet by mouth every 4 (four) hours as needed.  Qty: 30 tablet,  Refills: 0    Associated Diagnoses: Status post primary low transverse  section         CONTINUE these medications which have NOT CHANGED    Details   prenatal vit#103-iron-FA () 27 mg iron- 1 mg Tab Take 1 tablet by mouth once daily.  Qty: 30 tablet, Refills: 11    Associated Diagnoses: Supervision of normal first pregnancy, antepartum             Vu Gracie Matthew MD  Obstetrics  [facility]

## 2017-08-20 NOTE — PLAN OF CARE
Problem: Patient Care Overview  Goal: Plan of Care Review  Outcome: Ongoing (interventions implemented as appropriate)  Reviewed plan of care, verbalizes understanding

## 2017-08-20 NOTE — PROGRESS NOTES
Discharge teaching given to pt. With prescription education. Pt. verbalized understanding with good recall noted. Pt. enc. To call md office once discharged to schedule a f/u appt. and for any questions or concerns that may arise once discharged. No c/o pain at this time.

## 2017-08-20 NOTE — ASSESSMENT & PLAN NOTE
Now status post  section    Prescription for yrrsuka-tfmx-jopuww-tenofo ala [phone] mg Tab sent to pharmacy    Our nurse has called the pharmacy to make sure they have the medication in stock for her and her baby.

## 2017-08-20 NOTE — ASSESSMENT & PLAN NOTE
Patient discharged home on postpartum day #3, 8/20/2017   Discharge medications include Percocet, Motrin, prenatal vitamins, and iron supplement.  Prescription for tiniohb-swdp-iqttyw-tenofo ala [phone] mg Tab sent to pharmacy    Our nurse has called the pharmacy to make sure they have the medication in stock for her and her baby.    Follow-up with Dr Chan next week for dressing removal.

## 2017-08-20 NOTE — PLAN OF CARE
Problem: Patient Care Overview  Goal: Plan of Care Review  Outcome: Outcome(s) achieved Date Met: 08/20/17  VSS. Fundas and lochia WNL. Low transverse incision with Aquacel dry and intact.  Ambulating. Voiding. Tolerating diet.  Passing flatus.  Pain meds PRN. Patient verbalized understanding of not breastfeeding and the importance of taking anti-viral medication daily. Prescription given to patient to bring to pharmacy.

## 2017-08-21 LAB
HIV UQ DATE RECEIVED: ABNORMAL
HIV UQ DATE REPORTED: ABNORMAL
HIV1 RNA # SERPL NAA+PROBE: 419 COPIES/ML
HIV1 RNA SERPL NAA+PROBE-LOG#: 2.62 LOG (10) COPIES/ML
HIV1 RNA SERPL QL NAA+PROBE: DETECTED

## 2017-08-22 LAB
ABACAVIR ISLT GENOTYP: NORMAL
ATAZANAVIR+RITONAVIR ISLT GENOTYP: NORMAL
DARUNAVIR+RITONAVIR ISLT GENOTYP: NORMAL
DIDANOSINE ISLT GENOTYP: NORMAL
EFAVIRENZ ISLT GENOTYP: NORMAL
EMTRICITABINE: NORMAL
ETRAVIRINE ISLT GENOTYP: NORMAL
FOSAMPRENAVIR+RITONAVIR ISLT GENOTYP: NORMAL
HIV NNRTI GENE MUT DET ISLT: NORMAL
HIV PI GENE MUT DET ISLT: NORMAL
HIV RT GENE MUT DET ISLT: NORMAL
HIV RT GENE MUT DET ISLT: NORMAL
INDINAVIR+RITONAVIR ISLT GENOTYP: NORMAL
LAMIVUDINE: NORMAL
LOPINAVIR+RITONAVIR ISLT GENOTYP: NORMAL
NELFINAVIR ISLT GENOTYP: NORMAL
NEVIRAPINE ISLT GENOTYP: NORMAL
RILPIVIRINE ISLT GENOTYP: NORMAL
SAQUINAVIR+RITONAVIR ISLT GENOTYP: NORMAL
STAVUDINE ISLT GENOTYP: NORMAL
TENOFOVIR ISLT GENOTYP: NORMAL
TIPRANAVIR+RITONAVIR ISLT GENOTYP: NORMAL
ZIDOVUDINE ISLT GENOTYP: NORMAL

## 2017-08-23 LAB
DOLUTEGRAVIR ISLT GENOTYP: NORMAL
ELVITEGRAVIR ISLT GENOTYP: NORMAL
HIV-1 GENOTYPIC INTEGRASE RESIST.: NORMAL
INTEGRASE MUTATIONS: NORMAL
RALTEGRAVIR ISLT GENOTYP: NORMAL

## 2017-08-25 ENCOUNTER — POSTPARTUM VISIT (OUTPATIENT)
Dept: OBSTETRICS AND GYNECOLOGY | Facility: CLINIC | Age: 22
End: 2017-08-25
Payer: MEDICAID

## 2017-08-25 VITALS
HEIGHT: 66 IN | WEIGHT: 136.69 LBS | DIASTOLIC BLOOD PRESSURE: 72 MMHG | SYSTOLIC BLOOD PRESSURE: 119 MMHG | BODY MASS INDEX: 21.97 KG/M2

## 2017-08-25 DIAGNOSIS — Z98.891 STATUS POST PRIMARY LOW TRANSVERSE CESAREAN SECTION: Primary | ICD-10-CM

## 2017-08-25 DIAGNOSIS — B20 HIV (HUMAN IMMUNODEFICIENCY VIRUS INFECTION): ICD-10-CM

## 2017-08-25 PROCEDURE — 99213 OFFICE O/P EST LOW 20 MIN: CPT | Mod: PBBFAC | Performed by: OBSTETRICS & GYNECOLOGY

## 2017-08-25 PROCEDURE — 99213 OFFICE O/P EST LOW 20 MIN: CPT | Mod: S$PBB,,, | Performed by: OBSTETRICS & GYNECOLOGY

## 2017-08-25 PROCEDURE — 99999 PR PBB SHADOW E&M-EST. PATIENT-LVL III: CPT | Mod: PBBFAC,,, | Performed by: OBSTETRICS & GYNECOLOGY

## 2017-08-25 PROCEDURE — 3008F BODY MASS INDEX DOCD: CPT | Mod: ,,, | Performed by: OBSTETRICS & GYNECOLOGY

## 2017-08-26 LAB — HIV GENTYP ISLT: DETECTED

## 2017-08-27 NOTE — PROGRESS NOTES
Subjective:       Patient ID: Bonnie Mcdonald is a 22 y.o. female.    Chief Complaint:  Wound Check      History of Present Illness  HPI  Pt here for wound check. She is taking her HIV medication as directed. No pain.     GYN & OB History  Patient's last menstrual period was 2016.   Date of Last Pap: No result found    OB History    Para Term  AB Living   5 3 3   2 3   SAB TAB Ectopic Multiple Live Births   1 1   0 3      # Outcome Date GA Lbr John/2nd Weight Sex Delivery Anes PTL Lv   5 Term 17 37w6d  2.835 kg (6 lb 4 oz) M CS-LTranv Spinal N NOHEMY   4 Term 13   3.204 kg (7 lb 1 oz) M Vag-Spont EPI N NOHEMY      Birth Comments: System Generated. Please review and update pregnancy details.   3 Term 07/15/11   2.458 kg (5 lb 6.7 oz) M Vag-Spont EPI N NOHEMY   2 TAB            1 SAB                   Review of Systems  Review of Systems   Constitutional: Negative for chills and fever.   Respiratory: Negative for shortness of breath.    Cardiovascular: Negative for chest pain.   Gastrointestinal: Negative for abdominal pain and constipation.   Genitourinary: Positive for vaginal bleeding. Negative for pelvic pain.           Objective:    Physical Exam:   Constitutional: She is oriented to person, place, and time. She appears well-developed and well-nourished. No distress.    HENT:   Head: Normocephalic and atraumatic.    Eyes: EOM are normal.      Pulmonary/Chest: No respiratory distress.        Abdominal: Soft. Normal appearance and bowel sounds are normal. She exhibits abdominal incision ( healing well, no signs of infection). She exhibits no distension and no mass. There is no tenderness. There is no rigidity, no rebound and no guarding.                 Neurological: She is alert and oriented to person, place, and time.     Psychiatric: She has a normal mood and affect. Her behavior is normal.          Assessment:        1. Status post primary low transverse  section    2. HIV (human  immunodeficiency virus infection)              Plan:      1. Status post primary low transverse  section  - Incision healing well.     2. HIV (human immunodeficiency virus infection)  - Continue home medicine.   - Given number for [facility] health clinic.        Return in about 4 weeks (around 2017).

## 2017-12-18 ENCOUNTER — HOSPITAL ENCOUNTER (EMERGENCY)
Facility: OTHER | Age: 22
Discharge: HOME | End: 2017-12-18
Attending: EMERGENCY MEDICINE
Payer: MEDICAID

## 2017-12-18 VITALS
DIASTOLIC BLOOD PRESSURE: 60 MMHG | HEIGHT: 66 IN | HEART RATE: 97 BPM | SYSTOLIC BLOOD PRESSURE: 123 MMHG | OXYGEN SATURATION: 100 % | RESPIRATION RATE: 18 BRPM | BODY MASS INDEX: 21.21 KG/M2 | TEMPERATURE: 99 F | WEIGHT: 132 LBS

## 2017-12-18 DIAGNOSIS — R50.9 ACUTE FEBRILE ILLNESS: Primary | ICD-10-CM

## 2017-12-18 LAB
ALBUMIN SERPL-MCNC: 4.4 G/DL (ref 3.3–5.5)
ALP SERPL-CCNC: 40 U/L (ref 42–141)
B-HCG UR QL: NEGATIVE
BILIRUB SERPL-MCNC: 0.5 MG/DL (ref 0.2–1.6)
BUN SERPL-MCNC: 4 MG/DL (ref 7–22)
CALCIUM SERPL-MCNC: 9 MG/DL (ref 8–10.3)
CHLORIDE SERPL-SCNC: 102 MMOL/L (ref 98–108)
CREAT SERPL-MCNC: 0.9 MG/DL (ref 0.6–1.2)
CTP QC/QA: YES
CTP QC/QA: YES
FLUAV AG NPH QL: NEGATIVE
FLUBV AG NPH QL: NEGATIVE
GLUCOSE SERPL-MCNC: 84 MG/DL (ref 73–118)
POC ALT (SGPT): 17 U/L (ref 10–47)
POC AST (SGOT): 22 U/L (ref 11–38)
POC TCO2: 24 MMOL/L (ref 18–33)
POTASSIUM BLD-SCNC: 3.1 MMOL/L (ref 3.6–5.1)
PROTEIN, POC: 7.6 G/DL (ref 6.4–8.1)
SODIUM BLD-SCNC: 137 MMOL/L (ref 128–145)

## 2017-12-18 PROCEDURE — 25000003 PHARM REV CODE 250: Performed by: EMERGENCY MEDICINE

## 2017-12-18 PROCEDURE — 85025 COMPLETE CBC W/AUTO DIFF WBC: CPT

## 2017-12-18 PROCEDURE — 96361 HYDRATE IV INFUSION ADD-ON: CPT

## 2017-12-18 PROCEDURE — 87804 INFLUENZA ASSAY W/OPTIC: CPT

## 2017-12-18 PROCEDURE — 80053 COMPREHEN METABOLIC PANEL: CPT

## 2017-12-18 PROCEDURE — 96360 HYDRATION IV INFUSION INIT: CPT

## 2017-12-18 PROCEDURE — 81003 URINALYSIS AUTO W/O SCOPE: CPT

## 2017-12-18 PROCEDURE — 99284 EMERGENCY DEPT VISIT MOD MDM: CPT | Mod: 25

## 2017-12-18 PROCEDURE — 81025 URINE PREGNANCY TEST: CPT | Performed by: EMERGENCY MEDICINE

## 2017-12-18 RX ORDER — POTASSIUM CHLORIDE 20 MEQ/1
20 TABLET, EXTENDED RELEASE ORAL DAILY
Qty: 10 TABLET | Refills: 0 | Status: SHIPPED | OUTPATIENT
Start: 2017-12-18 | End: 2018-12-01 | Stop reason: ALTCHOICE

## 2017-12-18 RX ORDER — IBUPROFEN 600 MG/1
600 TABLET ORAL
Status: COMPLETED | OUTPATIENT
Start: 2017-12-18 | End: 2017-12-18

## 2017-12-18 RX ORDER — AZITHROMYCIN 250 MG/1
500 TABLET, FILM COATED ORAL DAILY
Qty: 6 TABLET | Refills: 0 | Status: SHIPPED | OUTPATIENT
Start: 2017-12-18 | End: 2018-12-01 | Stop reason: ALTCHOICE

## 2017-12-18 RX ORDER — ACETAMINOPHEN 500 MG
1000 TABLET ORAL
Status: COMPLETED | OUTPATIENT
Start: 2017-12-18 | End: 2017-12-18

## 2017-12-18 RX ORDER — SODIUM CHLORIDE 9 MG/ML
1000 INJECTION, SOLUTION INTRAVENOUS
Status: COMPLETED | OUTPATIENT
Start: 2017-12-18 | End: 2017-12-18

## 2017-12-18 RX ADMIN — ACETAMINOPHEN 1000 MG: 500 TABLET ORAL at 08:12

## 2017-12-18 RX ADMIN — IBUPROFEN 600 MG: 600 TABLET ORAL at 08:12

## 2017-12-18 RX ADMIN — SODIUM CHLORIDE 1000 ML: 900 INJECTION, SOLUTION INTRAVENOUS at 09:12

## 2017-12-19 NOTE — ED PROVIDER NOTES
"Encounter Date: 2017       History     Chief Complaint   Patient presents with    Headache     Pt states she has back pain, leg pain and headache today. + HIV. fever     "The patient presents with sore throat congestion headache fever generalized body aches denies nausea vomiting diarrhea and denies any neck pain or stiffness.  She also denies any photophobia.    The patient also reports a slip and fall 3 or 4 days ago with right hip pain.      The history is provided by the patient.   Fever   Primary symptoms of the febrile illness include fever, fatigue, headaches and myalgias. Primary symptoms do not include visual change, cough, wheezing, shortness of breath, abdominal pain, nausea, vomiting, diarrhea, dysuria, altered mental status, arthralgias or rash. The current episode started today. This is a new problem. The problem has not changed since onset.  The maximum temperature recorded prior to her arrival was 101 to 101.9 F. The temperature was taken by a tympanic thermometer.   The fatigue began today. The fatigue has been unchanged since its onset.   The headache began today. Headache is a new problem. Location/region(s) of the headache: frontal. The headache is not associated with aura, photophobia, eye pain, visual change, neck stiffness, paresthesias or loss of balance.     Myalgias began today. The myalgias have been unchanged since their onset. The myalgias are generalized. The myalgia pain is at a severity of 2/10.     Review of patient's allergies indicates:  No Known Allergies  Past Medical History:   Diagnosis Date    HIV (human immunodeficiency virus infection)      Past Surgical History:   Procedure Laterality Date     SECTION, LOW TRANSVERSE      miscarriage        Family History   Problem Relation Age of Onset    Breast cancer Neg Hx     Colon cancer Neg Hx     Ovarian cancer Neg Hx      Social History   Substance Use Topics    Smoking status: Never Smoker    Smokeless " tobacco: Not on file    Alcohol use No     Review of Systems   Constitutional: Positive for fatigue and fever.   HENT: Negative.    Eyes: Negative.  Negative for photophobia and pain.   Respiratory: Negative.  Negative for cough, shortness of breath and wheezing.    Cardiovascular: Negative.    Gastrointestinal: Negative.  Negative for abdominal pain, diarrhea, nausea and vomiting.   Endocrine: Negative.    Genitourinary: Negative.  Negative for dysuria.   Musculoskeletal: Positive for myalgias. Negative for arthralgias, neck pain and neck stiffness.   Skin: Negative.  Negative for rash.   Allergic/Immunologic: Negative.    Neurological: Positive for headaches. Negative for paresthesias and loss of balance.   Hematological: Negative.    Psychiatric/Behavioral: Negative.    All other systems reviewed and are negative.      Physical Exam     Initial Vitals [12/18/17 1947]   BP Pulse Resp Temp SpO2   134/80 (!) 130 20 (!) 102.1 °F (38.9 °C) 100 %      MAP       98         Physical Exam    Nursing note and vitals reviewed.  Constitutional: Vital signs are normal. She appears well-developed. She is active and cooperative.   HENT:   Head: Normocephalic and atraumatic.   Right Ear: Tympanic membrane normal.   Left Ear: Tympanic membrane normal.   Nose: Nose normal. Right sinus exhibits no maxillary sinus tenderness and no frontal sinus tenderness. Left sinus exhibits no maxillary sinus tenderness and no frontal sinus tenderness.   Mouth/Throat: Uvula is midline, oropharynx is clear and moist and mucous membranes are normal.   Eyes: Conjunctivae, EOM and lids are normal. Pupils are equal, round, and reactive to light.   Fundoscopic exam:       The right eye shows no papilledema.        The left eye shows no papilledema.   Neck: Trachea normal and full passive range of motion without pain. Neck supple. No thyroid mass present. No spinous process tenderness and no muscular tenderness present. No edema, no erythema and normal  range of motion present. No neck rigidity. No Brudzinski's sign and no Kernig's sign noted.   Cardiovascular: Normal rate, regular rhythm, S1 normal, S2 normal, normal heart sounds, intact distal pulses and normal pulses.   Pulmonary/Chest: Effort normal and breath sounds normal.   Abdominal: Soft. Normal appearance, normal aorta and bowel sounds are normal.   Musculoskeletal: Normal range of motion.        Right hip: She exhibits tenderness. She exhibits normal range of motion, normal strength, no bony tenderness and no swelling.   Lymphadenopathy:     She has no axillary adenopathy.   Neurological: She is alert and oriented to person, place, and time. She has normal strength. No cranial nerve deficit or sensory deficit. She displays a negative Romberg sign. GCS eye subscore is 4. GCS verbal subscore is 5. GCS motor subscore is 6.   Skin: Skin is warm, dry and intact.   Psychiatric: She has a normal mood and affect. Her speech is normal and behavior is normal. Judgment and thought content normal. Cognition and memory are normal.         ED Course   Procedures  Labs Reviewed   POCT CMP - Abnormal; Notable for the following:        Result Value    Alkaline Phosphatase, POC 40 (*)     POC BUN 4 (*)     POC Potassium 3.1 (*)     All other components within normal limits   POCT URINE PREGNANCY   POCT INFLUENZA A/B          X-Rays:   Independently Interpreted Readings:   Other Readings:  X-ray interpretation by me of pelvis is unremarkable    Medical Decision Making:   ED Management:  This patient presents with a history of HIV and fever.  The patient has signs and symptoms consistent with influenza.  An influenza swab was done and was unremarkable.  The patient received IV fluid resuscitation as well as Tylenol and Motrin in the department.  Patient's currently resting comfortably sleeping and easily aroused with complete resolution of her headache.  The headache is secondary to the febrile illness related to  meningitis.  The patient's CD4 count is normal and the patient has defervesced in the emergency department and patient does have a potassium of 3.1 and I will supplement that with oral potassium as an outpatient.  It was the patient is ready for discharge.                   ED Course      Clinical Impression:   The encounter diagnosis was Acute febrile illness.                           Chaim Mayo MD  12/18/17 0557       Chaim Mayo MD  12/18/17 [id number]

## 2017-12-19 NOTE — DISCHARGE INSTRUCTIONS
Tylenol every 4 hours, Motrin every 6 hours and plenty of fluids in addition to the medications prescribed.

## 2017-12-20 LAB
BILIRUBIN, POC UA: ABNORMAL
BLOOD, POC UA: ABNORMAL
CLARITY, POC UA: CLEAR
COLOR, POC UA: ABNORMAL
GLUCOSE, POC UA: NEGATIVE
KETONES, POC UA: ABNORMAL
LEUKOCYTE EST, POC UA: NEGATIVE
NITRITE, POC UA: NEGATIVE
PH UR STRIP: 7.5 [PH]
PROTEIN, POC UA: ABNORMAL
SPECIFIC GRAVITY, POC UA: 1.02
UROBILINOGEN, POC UA: 2 E.U./DL

## 2018-11-18 ENCOUNTER — HOSPITAL ENCOUNTER (EMERGENCY)
Facility: HOSPITAL | Age: 23
Discharge: HOME | End: 2018-11-18
Attending: EMERGENCY MEDICINE
Payer: MEDICAID

## 2018-11-18 VITALS
SYSTOLIC BLOOD PRESSURE: 118 MMHG | TEMPERATURE: 98 F | BODY MASS INDEX: 22.5 KG/M2 | WEIGHT: 140 LBS | RESPIRATION RATE: 16 BRPM | OXYGEN SATURATION: 99 % | HEIGHT: 66 IN | HEART RATE: 94 BPM | DIASTOLIC BLOOD PRESSURE: 69 MMHG

## 2018-11-18 DIAGNOSIS — M77.52 LEFT ANKLE TENDONITIS: Primary | ICD-10-CM

## 2018-11-18 LAB
B-HCG UR QL: NEGATIVE
CTP QC/QA: YES

## 2018-11-18 PROCEDURE — 96372 THER/PROPH/DIAG INJ SC/IM: CPT

## 2018-11-18 PROCEDURE — 63600175 PHARM REV CODE 636 W HCPCS: Performed by: PHYSICIAN ASSISTANT

## 2018-11-18 PROCEDURE — 81025 URINE PREGNANCY TEST: CPT | Performed by: NURSE PRACTITIONER

## 2018-11-18 PROCEDURE — 99284 EMERGENCY DEPT VISIT MOD MDM: CPT | Mod: 25

## 2018-11-18 PROCEDURE — 25000003 PHARM REV CODE 250: Performed by: PHYSICIAN ASSISTANT

## 2018-11-18 RX ORDER — KETOROLAC TROMETHAMINE 30 MG/ML
15 INJECTION, SOLUTION INTRAMUSCULAR; INTRAVENOUS
Status: COMPLETED | OUTPATIENT
Start: 2018-11-18 | End: 2018-11-18

## 2018-11-18 RX ORDER — LIDOCAINE 50 MG/G
1 PATCH TOPICAL
Status: DISCONTINUED | OUTPATIENT
Start: 2018-11-18 | End: 2018-11-18 | Stop reason: HOSPADM

## 2018-11-18 RX ORDER — LIDOCAINE 50 MG/G
1 PATCH TOPICAL DAILY
Qty: 15 PATCH | Refills: 0 | Status: SHIPPED | OUTPATIENT
Start: 2018-11-18 | End: 2018-12-03

## 2018-11-18 RX ORDER — ACETAMINOPHEN 500 MG
500 TABLET ORAL EVERY 4 HOURS PRN
Qty: 20 TABLET | Refills: 0 | Status: SHIPPED | OUTPATIENT
Start: 2018-11-18 | End: 2018-11-23

## 2018-11-18 RX ORDER — IBUPROFEN 600 MG/1
600 TABLET ORAL EVERY 6 HOURS PRN
Qty: 20 TABLET | Refills: 0 | Status: SHIPPED | OUTPATIENT
Start: 2018-11-18 | End: 2018-11-23

## 2018-11-18 RX ADMIN — LIDOCAINE 1 PATCH: 50 PATCH TOPICAL at 09:11

## 2018-11-18 RX ADMIN — KETOROLAC TROMETHAMINE 15 MG: 30 INJECTION, SOLUTION INTRAMUSCULAR at 09:11

## 2018-11-19 NOTE — DISCHARGE INSTRUCTIONS
Take medications as prescribed.   Follow attached instructions.   Follow up with primary care in 2 days. Follow up with Orthopedics for persisting symptoms. Return to ER for worsening symptoms or as needed.

## 2018-11-19 NOTE — ED TRIAGE NOTES
23 y.o female presents to the ED for evaluation of left ankle pain x2 days. She denies trauma, reports ankle hurts when walking.

## 2018-11-19 NOTE — ED PROVIDER NOTES
"Encounter Date: 2018  This is a SORT/MSE of a 23 y.o. female with HIV presenting to the ED with c/o atraumatic left ankle pain x2 days.  Care will be transferred to an alternate provider when patient is roomed for a full evaluation and final disposition. Patient is aware that he/she is awaiting a room in the emergency department, where another provider will review results, evaluate and treat as needed. IVANA Schafer DNP  SCRIBE #1 NOTE: IJulieth am scribing for, and in the presence of,  Daphnie Thompson PA-C. I have scribed the following portions of the note - Other sections scribed: HPI, ROS.       History     Chief Complaint   Patient presents with    Ankle Pain     Left ankle pain x 2 days. Denies trauma. Reports pain is present when walking      CC: Ankle Pain    HPI: This is a 24 y/o female with HIV and hx of foot fracture who presents to the ED for acute onset left ankle pain that began x2 days ago. Pt rates pain as severe (10/10) and constant. Pt denies any injuries or trauma to the ankle. Pt is able to ambulate. Denies attempted tx. Denies hx of IV drug abuse. Pt notes foot fracture in , where she was put into a cast and given crutches. Pt notes the fracture healed fine with no complications. 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 Denies fever, redness, rash, wounds, nausea, vomiting, or chills. No further symptoms reported.     Of note, pt states that she is compliant with Genvoya. She states that her CD4 was "good" at last appt with Dr. Farrell.      The history is provided by the patient. No  was used.     Review of patient's allergies indicates:  No Known Allergies  Past Medical History:   Diagnosis Date    HIV (human immunodeficiency virus infection)      Past Surgical History:   Procedure Laterality Date     SECTION, LOW TRANSVERSE      DELIVERY- SECTION Bilateral 2017    Performed by Jocelyn Chan MD at " [facility] L[facility]D [facility]    miscarriage        Family History   Problem Relation Age of Onset    Breast cancer Neg Hx     Colon cancer Neg Hx     Ovarian cancer Neg Hx      Social History     Tobacco Use    Smoking status: Never Smoker   Substance Use Topics    Alcohol use: No    Drug use: No     Review of Systems   Constitutional: Negative for chills and fever.   HENT: Negative for congestion, ear pain, rhinorrhea and sore throat.    Eyes: Negative for pain and visual disturbance.   Respiratory: Negative for cough and shortness of breath.    Cardiovascular: Negative for chest pain.   Gastrointestinal: Negative for abdominal pain, diarrhea, nausea and vomiting.   Genitourinary: Negative for dysuria.   Musculoskeletal: Positive for arthralgias (to left ankle). Negative for back pain and neck pain.   Skin: Negative for rash.   Neurological: Negative for headaches.       Physical Exam     Initial Vitals [11/18/18 2031]   BP Pulse Resp Temp SpO2   123/77 80 16 98.1 °F (36.7 °C) 100 %      MAP       --         Physical Exam    Nursing note and vitals reviewed.  Constitutional: She appears well-developed and well-nourished.   HENT:   Head: Normocephalic.   Right Ear: External ear normal.   Left Ear: External ear normal.   Eyes: Conjunctivae are normal.   Cardiovascular:   Pulses:       Dorsalis pedis pulses are 2+ on the left side.   Pulmonary/Chest: No respiratory distress.   Abdominal: There is no tenderness at McBurney's point and negative Avila's sign.   Musculoskeletal: Normal range of motion.   No bony TTP of L foot or L ankle. TTP over anterior tibialis tendon with worsening pain with flexion and extension of L ankle. No overlying swelling, redness, erythema or wounds.    Neurological: She is alert. She has normal strength. No sensory deficit.   Skin: Skin is warm and dry. No rash noted.   Psychiatric: She has a normal mood and affect.         ED Course   Procedures  Labs Reviewed   POCT URINE PREGNANCY          Imaging  Results    None          Medical Decision Making:   Initial Assessment:   24 y/o female with history of HIV foot fracture presenting for atraumatic L ankle pain x 2 day worse with walking. Able to ambulate. Denies fever, redness, nausea, vomiting, rash, wounds, history of IVDU, weakness, paresthesias. No attempted tx. Considered but doubt fracture or dislocation, cellulitis, septic joint. Think this is likely tendonitis. Toradol and lidoderm applied in ED. Instructed to RICE. Lodine, Tylenol and Lidoderm for symptomatic treatment. PCP follow up in 2 days. Ortho if symptoms persist. ER if symptoms worsen or as needed.             Scribe Attestation:   Scribe #1: I performed the above scribed service and the documentation accurately describes the services I performed. I attest to the accuracy of the note.    Attending Attestation:           Physician Attestation for Scribe:  Physician Attestation Statement for Scribe #1: I, Daphnie Thompson PA-C, reviewed documentation, as scribed by Julieth Meyer in my presence, and it is both accurate and complete.                    Clinical Impression:   The encounter diagnosis was Left ankle tendonitis.                             Daphine Thompson PA-C  11/18/18 2618

## 2018-12-01 ENCOUNTER — HOSPITAL ENCOUNTER (EMERGENCY)
Facility: HOSPITAL | Age: 23
Discharge: HOME | End: 2018-12-02
Attending: EMERGENCY MEDICINE
Payer: MEDICAID

## 2018-12-01 DIAGNOSIS — M25.572 LEFT ANKLE PAIN: ICD-10-CM

## 2018-12-01 DIAGNOSIS — S90.02XA CONTUSION OF LEFT ANKLE, INITIAL ENCOUNTER: Primary | ICD-10-CM

## 2018-12-01 DIAGNOSIS — S99.922A FOOT INJURY, LEFT, INITIAL ENCOUNTER: ICD-10-CM

## 2018-12-01 LAB
B-HCG UR QL: NEGATIVE
CTP QC/QA: YES

## 2018-12-01 PROCEDURE — 63600175 PHARM REV CODE 636 W HCPCS: Performed by: NURSE PRACTITIONER

## 2018-12-01 PROCEDURE — 81025 URINE PREGNANCY TEST: CPT | Performed by: PHYSICIAN ASSISTANT

## 2018-12-01 PROCEDURE — 99284 EMERGENCY DEPT VISIT MOD MDM: CPT | Mod: 25

## 2018-12-01 PROCEDURE — 96372 THER/PROPH/DIAG INJ SC/IM: CPT

## 2018-12-01 RX ORDER — NAPROXEN 500 MG/1
500 TABLET ORAL 2 TIMES DAILY PRN
Qty: 15 TABLET | Refills: 0 | OUTPATIENT
Start: 2018-12-01 | End: 2019-01-27

## 2018-12-01 RX ORDER — TRAMADOL HYDROCHLORIDE 50 MG/1
50 TABLET ORAL EVERY 6 HOURS
COMMUNITY

## 2018-12-01 RX ORDER — KETOROLAC TROMETHAMINE 30 MG/ML
30 INJECTION, SOLUTION INTRAMUSCULAR; INTRAVENOUS
Status: COMPLETED | OUTPATIENT
Start: 2018-12-01 | End: 2018-12-01

## 2018-12-01 RX ADMIN — KETOROLAC TROMETHAMINE 30 MG: 30 INJECTION, SOLUTION INTRAMUSCULAR at 10:12

## 2018-12-02 VITALS
OXYGEN SATURATION: 97 % | DIASTOLIC BLOOD PRESSURE: 54 MMHG | RESPIRATION RATE: 18 BRPM | TEMPERATURE: 98 F | HEART RATE: 76 BPM | WEIGHT: 144 LBS | HEIGHT: 66 IN | SYSTOLIC BLOOD PRESSURE: 108 MMHG | BODY MASS INDEX: 23.14 KG/M2

## 2018-12-02 NOTE — DISCHARGE INSTRUCTIONS
Take pain medication as needed only as prescribed.  Keep the affected ankle elevated above the level of your heart as often as possible.  Use Ace wrap and ice packs to reduce pain and swelling.    Follow-up with your regular doctor or the one provided on your paperwork for re-evaluation and further treatment.    Return to the emergency department for any new or worsening symptoms or as needed for any additional concerns.

## 2018-12-02 NOTE — ED TRIAGE NOTES
"Arrived with c/o L-ankle.  Yesterday, pt had a cart hit the back of her heel. "I went down, I couldn't walk for 10 minutes". Ice applied to area. Area is skinned and scabbing. Has some pain when moving ankle.   "

## 2018-12-02 NOTE — ED PROVIDER NOTES
"Encounter Date: 2018    SCRIBE #1 NOTE: I, Ang Nieto, am scribing for, and in the presence of,  Dustin Valdes NP. I have scribed the following portions of the note - Other sections scribed: HPI and ROS.       History     Chief Complaint   Patient presents with    Foot Injury     C/o left foot pain "I was at work and the cart ran over the back of my foot."     CC: Foot Injury     HPI: This 23 y.o F with HIV presents to the ED c/o acute onset of constant and moderate (6/10) left posterior heel and posterior foot pain since yesterday. The pt states her pain is secondary to a cart hitting the back of her foot while at work yesterday. She does note that the cart caused a blister to the posterior heel. Pain is worse when ambulating. She denies fever, chills, diaphoresis, knee pain, hip pain, chest pain, abdominal pain, dysuria, SOB and rash. She attempted tx with tramadol today with no relief.           Review of patient's allergies indicates:  No Known Allergies  Past Medical History:   Diagnosis Date    HIV (human immunodeficiency virus infection)      Past Surgical History:   Procedure Laterality Date     SECTION, LOW TRANSVERSE      DELIVERY- SECTION Bilateral 2017    Performed by Jocelyn Chan MD at [facility] L[facility]    miscarriage        Family History   Problem Relation Age of Onset    Breast cancer Neg Hx     Colon cancer Neg Hx     Ovarian cancer Neg Hx      Social History     Tobacco Use    Smoking status: Never Smoker    Smokeless tobacco: Never Used   Substance Use Topics    Alcohol use: No    Drug use: No     Review of Systems   Constitutional: Negative for chills, diaphoresis and fever.   HENT: Negative for rhinorrhea and sore throat.    Eyes: Negative for redness.   Respiratory: Negative for cough and shortness of breath.    Cardiovascular: Negative for chest pain.   Gastrointestinal: Negative for abdominal pain, diarrhea, nausea and vomiting.   Genitourinary: Negative for " dysuria, frequency and urgency.   Musculoskeletal: Negative for back pain and neck pain.        (+) left posterior heel pain  (+) left posterior foot pain   Skin: Negative for rash.   Psychiatric/Behavioral: The patient is not nervous/anxious.        Physical Exam     Initial Vitals [12/01/18 2205]   BP Pulse Resp Temp SpO2   (!) 126/58 90 18 98.2 °F (36.8 °C) 96 %      MAP       --         Physical Exam    Nursing note and vitals reviewed.  Constitutional: Vital signs are normal. She appears well-developed and well-nourished. She is not diaphoretic. She is active and cooperative.  Non-toxic appearance. She does not have a sickly appearance. She does not appear ill. No distress.   HENT:   Head: Normocephalic and atraumatic.   Right Ear: External ear normal.   Left Ear: External ear normal.   Nose: Nose normal.   Eyes: Conjunctivae and EOM are normal. Right eye exhibits no discharge. Left eye exhibits no discharge.   Neck: Normal range of motion. Neck supple. No tracheal deviation present.   Cardiovascular: Normal rate.   Pulmonary/Chest: No stridor. No respiratory distress.   Abdominal: Soft. She exhibits no distension. There is no tenderness.   Musculoskeletal: Normal range of motion. She exhibits no tenderness.        Left ankle: Achilles tendon exhibits normal Duran's test results.   Tenderness to the posterior left ankle and left foot.  There is a small abrasion to the posterior left foot.  No erythema, warmth, induration, fluctuance, drainage, or other signs of infection.  No swelling, bruising, deformity, or ligamentous laxity to the ankle.  Active range of motion of the ankle is normal but painful.  Duran's test is negative.   Neurological: She is alert and oriented to person, place, and time. She has normal strength. No cranial nerve deficit or sensory deficit.   Skin: Skin is warm and dry.   Psychiatric: She has a normal mood and affect. Her behavior is normal. Judgment and thought content normal.          ED Course   Procedures  Labs Reviewed   POCT URINE PREGNANCY          Imaging Results          X-Ray Ankle Complete Left (Final result)  Result time 12/01/18 22:49:38    Final result by Maicol Luna MD (12/01/18 22:49:38)                 Impression:      No radiographic evidence of acute fracture or dislocation.      Electronically signed by: Maicol Luna MD  Date:    12/01/2018  Time:    22:49             Narrative:    EXAMINATION:  XR ANKLE COMPLETE 3 VIEW LEFT    CLINICAL HISTORY:  Pain in left ankle and joints of left foot    TECHNIQUE:  AP, lateral and oblique views of the left ankle were performed.    COMPARISON:  Left ankle radiograph series 01/02/2011    FINDINGS:  There is no evidence of acute fracture or dislocation.  The ankle mortise appears maintained.  Stable small well corticated ossific densities noted adjacent to the dorsal talus which may be degenerative or sequelae of remote trauma, unchanged compared to prior study of January 2011.  Visualized soft tissues are unremarkable.                               X-Ray Foot Complete Left (Final result)  Result time 12/01/18 22:49:27    Final result by Maicol Luna MD (12/01/18 22:49:27)                 Impression:      No acute radiographic evidence of acute fracture.      Electronically signed by: Maicol Luna MD  Date:    12/01/2018  Time:    22:49             Narrative:    EXAMINATION:  XR FOOT COMPLETE 3 VIEW LEFT    CLINICAL HISTORY:  .  Unspecified injury of left foot, initial encounter    TECHNIQUE:  AP, lateral and oblique views of the left foot were performed.    COMPARISON:  None    FINDINGS:  No evidence of acute fracture or dislocation.  The Lisfranc interval appears within normal limits.  Visualized soft tissues are unremarkable.                                 Medical Decision Making:   History:   Old Medical Records: I decided to obtain old medical records.  Differential Diagnosis:   Fracture, dislocation,  infection, Achilles tendon rupture, others  Clinical Tests:   Radiological Study: Ordered and Reviewed  ED Management:  23-year-old female presenting for evaluation of left posterior foot and left ankle pain.  See HPI and physical exam above.  There is a small superficial abrasion in the area of the patient's injury, however there is no erythema, warmth, induration, fluctuance, drainage, or other signs of infection.  Wound appears to be healing normally.  No ligamentous laxity, deformity, bruising, swelling, effusion to the left posterior ankle or foot.  Active range of motion is painful but intact. Negative Duran's test.  Very low suspicion for Achilles injury. DP pulses normal. Capillary refill is brisk.  X-ray of the left foot and left ankle show no evidence of fracture, dislocation, or other acute abnormality.  Applied ice pack and Ace wrap.  Symptoms consistent with left posterior foot and left posterior ankle contusion.  Treated with Toradol in the ED and prescribed naproxen at discharge. Advised patient to follow up with her PCP for re-evaluation and further management.  ED return precautions given. All questions regarding diagnosis and plan were answered to the patient's fullest possible satisfaction. Patient expressed understanding of diagnosis, discharge instructions, and return precautions.    My attending physician was available for consultation during this case            Scribe Attestation:   Scribe #1: I performed the above scribed service and the documentation accurately describes the services I performed. I attest to the accuracy of the note.    Attending Attestation:           Physician Attestation for Scribe:  Physician Attestation Statement for Scribe #1: I, Dustin Valdes NP, reviewed documentation, as scribed by Ang Nieto in my presence, and it is both accurate and complete.                    Clinical Impression:   The primary encounter diagnosis was Contusion of left ankle, initial  encounter. Diagnoses of Left ankle pain and Foot injury, left, initial encounter were also pertinent to this visit.      Disposition:   Disposition: Discharged  Condition: Stable                        Dustin Valdes NP  12/02/18 0000

## 2019-01-09 ENCOUNTER — HOSPITAL ENCOUNTER (EMERGENCY)
Facility: HOSPITAL | Age: 24
Discharge: HOME | End: 2019-01-10
Attending: EMERGENCY MEDICINE
Payer: MEDICAID

## 2019-01-09 DIAGNOSIS — O26.899 PELVIC PAIN IN PREGNANCY: Primary | ICD-10-CM

## 2019-01-09 DIAGNOSIS — N76.0 BACTERIAL VAGINOSIS: ICD-10-CM

## 2019-01-09 DIAGNOSIS — Z34.90 INTRAUTERINE PREGNANCY: ICD-10-CM

## 2019-01-09 DIAGNOSIS — N30.00 ACUTE CYSTITIS WITHOUT HEMATURIA: ICD-10-CM

## 2019-01-09 DIAGNOSIS — Z20.2 EXPOSURE TO STD: ICD-10-CM

## 2019-01-09 DIAGNOSIS — B96.89 BACTERIAL VAGINOSIS: ICD-10-CM

## 2019-01-09 DIAGNOSIS — R10.2 PELVIC PAIN IN PREGNANCY: Primary | ICD-10-CM

## 2019-01-09 LAB
B-HCG UR QL: POSITIVE
BACTERIA #/AREA URNS HPF: ABNORMAL /HPF
BILIRUB UR QL STRIP: NEGATIVE
CLARITY UR: ABNORMAL
COLOR UR: YELLOW
CTP QC/QA: YES
GLUCOSE UR QL STRIP: NEGATIVE
HGB UR QL STRIP: ABNORMAL
KETONES UR QL STRIP: NEGATIVE
LEUKOCYTE ESTERASE UR QL STRIP: ABNORMAL
MICROSCOPIC COMMENT: ABNORMAL
NITRITE UR QL STRIP: NEGATIVE
PH UR STRIP: 6 [PH] (ref 5–8)
PROT UR QL STRIP: NEGATIVE
RBC #/AREA URNS HPF: 3 /HPF (ref 0–4)
SP GR UR STRIP: 1.02 (ref 1–1.03)
SQUAMOUS #/AREA URNS HPF: 4 /HPF
URN SPEC COLLECT METH UR: ABNORMAL
UROBILINOGEN UR STRIP-ACNC: ABNORMAL EU/DL
WBC #/AREA URNS HPF: 3 /HPF (ref 0–5)

## 2019-01-09 PROCEDURE — 81000 URINALYSIS NONAUTO W/SCOPE: CPT

## 2019-01-09 PROCEDURE — 87077 CULTURE AEROBIC IDENTIFY: CPT

## 2019-01-09 PROCEDURE — 87086 URINE CULTURE/COLONY COUNT: CPT

## 2019-01-09 PROCEDURE — 87088 URINE BACTERIA CULTURE: CPT

## 2019-01-09 PROCEDURE — 81025 URINE PREGNANCY TEST: CPT | Performed by: PHYSICIAN ASSISTANT

## 2019-01-09 PROCEDURE — 96372 THER/PROPH/DIAG INJ SC/IM: CPT

## 2019-01-09 PROCEDURE — 87186 SC STD MICRODIL/AGAR DIL: CPT

## 2019-01-09 PROCEDURE — 99284 EMERGENCY DEPT VISIT MOD MDM: CPT | Mod: 25

## 2019-01-09 RX ORDER — CEFTRIAXONE 250 MG/1
250 INJECTION, POWDER, FOR SOLUTION INTRAMUSCULAR; INTRAVENOUS
Status: COMPLETED | OUTPATIENT
Start: 2019-01-10 | End: 2019-01-10

## 2019-01-09 RX ORDER — AZITHROMYCIN 250 MG/1
1000 TABLET, FILM COATED ORAL
Status: COMPLETED | OUTPATIENT
Start: 2019-01-10 | End: 2019-01-10

## 2019-01-09 RX ORDER — LIDOCAINE HYDROCHLORIDE 10 MG/ML
1 INJECTION INFILTRATION; PERINEURAL
Status: COMPLETED | OUTPATIENT
Start: 2019-01-10 | End: 2019-01-10

## 2019-01-10 VITALS
BODY MASS INDEX: 22.18 KG/M2 | WEIGHT: 138 LBS | OXYGEN SATURATION: 98 % | HEIGHT: 66 IN | HEART RATE: 78 BPM | RESPIRATION RATE: 18 BRPM | TEMPERATURE: 99 F | DIASTOLIC BLOOD PRESSURE: 70 MMHG | SYSTOLIC BLOOD PRESSURE: 120 MMHG

## 2019-01-10 LAB
ABO + RH BLD: NORMAL
ALBUMIN SERPL BCP-MCNC: 4.1 G/DL
ALP SERPL-CCNC: 64 U/L
ALT SERPL W/O P-5'-P-CCNC: 9 U/L
ANION GAP SERPL CALC-SCNC: 12 MMOL/L
AST SERPL-CCNC: 19 U/L
BACTERIA GENITAL QL WET PREP: ABNORMAL
BASOPHILS # BLD AUTO: 0.03 K/UL
BASOPHILS NFR BLD: 0.4 %
BILIRUB SERPL-MCNC: 0.2 MG/DL
BUN SERPL-MCNC: 10 MG/DL
C TRACH DNA SPEC QL NAA+PROBE: NOT DETECTED
CALCIUM SERPL-MCNC: 9.5 MG/DL
CHLORIDE SERPL-SCNC: 105 MMOL/L
CLUE CELLS VAG QL WET PREP: ABNORMAL
CO2 SERPL-SCNC: 22 MMOL/L
CREAT SERPL-MCNC: 1 MG/DL
DIFFERENTIAL METHOD: ABNORMAL
EOSINOPHIL # BLD AUTO: 0 K/UL
EOSINOPHIL NFR BLD: 0.3 %
ERYTHROCYTE [DISTWIDTH] IN BLOOD BY AUTOMATED COUNT: 12.3 %
EST. GFR  (AFRICAN AMERICAN): >60 ML/MIN/1.73 M^2
EST. GFR  (NON AFRICAN AMERICAN): >60 ML/MIN/1.73 M^2
FILAMENT FUNGI VAG WET PREP-#/AREA: ABNORMAL
GLUCOSE SERPL-MCNC: 88 MG/DL
HCG INTACT+B SERPL-ACNC: 2058 MIU/ML
HCT VFR BLD AUTO: 35.2 %
HGB BLD-MCNC: 12.2 G/DL
LYMPHOCYTES # BLD AUTO: 2.8 K/UL
LYMPHOCYTES NFR BLD: 39.9 %
MCH RBC QN AUTO: 30.7 PG
MCHC RBC AUTO-ENTMCNC: 34.7 G/DL
MCV RBC AUTO: 88 FL
MONOCYTES # BLD AUTO: 0.5 K/UL
MONOCYTES NFR BLD: 7.7 %
N GONORRHOEA DNA SPEC QL NAA+PROBE: NOT DETECTED
NEUTROPHILS # BLD AUTO: 3.6 K/UL
NEUTROPHILS NFR BLD: 51.7 %
PLATELET # BLD AUTO: 347 K/UL
PMV BLD AUTO: 9.2 FL
POTASSIUM SERPL-SCNC: 3.6 MMOL/L
PROT SERPL-MCNC: 7.9 G/DL
RBC # BLD AUTO: 3.98 M/UL
SODIUM SERPL-SCNC: 139 MMOL/L
SPECIMEN SOURCE: ABNORMAL
T VAGINALIS GENITAL QL WET PREP: ABNORMAL
WBC # BLD AUTO: 6.92 K/UL
WBC #/AREA VAG WET PREP: ABNORMAL
YEAST GENITAL QL WET PREP: ABNORMAL

## 2019-01-10 PROCEDURE — 84702 CHORIONIC GONADOTROPIN TEST: CPT

## 2019-01-10 PROCEDURE — 63600175 PHARM REV CODE 636 W HCPCS: Performed by: PHYSICIAN ASSISTANT

## 2019-01-10 PROCEDURE — 87491 CHLMYD TRACH DNA AMP PROBE: CPT

## 2019-01-10 PROCEDURE — 25000003 PHARM REV CODE 250: Performed by: PHYSICIAN ASSISTANT

## 2019-01-10 PROCEDURE — 80053 COMPREHEN METABOLIC PANEL: CPT

## 2019-01-10 PROCEDURE — 85025 COMPLETE CBC W/AUTO DIFF WBC: CPT

## 2019-01-10 PROCEDURE — 87210 SMEAR WET MOUNT SALINE/INK: CPT

## 2019-01-10 PROCEDURE — 86901 BLOOD TYPING SEROLOGIC RH(D): CPT

## 2019-01-10 RX ORDER — ACETAMINOPHEN 325 MG/1
650 TABLET ORAL
Status: DISCONTINUED | OUTPATIENT
Start: 2019-01-10 | End: 2019-01-10 | Stop reason: HOSPADM

## 2019-01-10 RX ORDER — METRONIDAZOLE 7.5 MG/G
1 GEL VAGINAL DAILY
Qty: 5 APPLICATOR | Refills: 0 | Status: SHIPPED | OUTPATIENT
Start: 2019-01-10 | End: 2019-01-15

## 2019-01-10 RX ORDER — CEPHALEXIN 500 MG/1
500 CAPSULE ORAL 2 TIMES DAILY
Qty: 6 CAPSULE | Refills: 0 | Status: SHIPPED | OUTPATIENT
Start: 2019-01-10 | End: 2019-01-13

## 2019-01-10 RX ADMIN — CEFTRIAXONE SODIUM 250 MG: 250 INJECTION, POWDER, FOR SOLUTION INTRAMUSCULAR; INTRAVENOUS at 12:01

## 2019-01-10 RX ADMIN — LIDOCAINE HYDROCHLORIDE 1 ML: 10 INJECTION, SOLUTION INFILTRATION; PERINEURAL at 12:01

## 2019-01-10 RX ADMIN — AZITHROMYCIN 1000 MG: 250 TABLET, FILM COATED ORAL at 12:01

## 2019-01-10 NOTE — DISCHARGE INSTRUCTIONS
Take medications as directed.    You can take Tylenol every 4 hours for pain    Follow-up with OBGYN as soon as possible.    Take prenatal vitamins daily.    Return to the ER for any concerns.

## 2019-01-10 NOTE — ED PROVIDER NOTES
"Encounter Date: 2019    SCRIBE #1 NOTE: I, Ang Nieto, am scribing for, and in the presence of,  Candace Gordon PA-C. I have scribed the following portions of the note - Other sections scribed: HPI and ROS.       History     Chief Complaint   Patient presents with    Pelvic Discomfort     " my stomach been hurting i think i have a bladder infection" c/o pelvic pain since 2 days      CC: Pelvic Discomfort    HPI: This 23 y.o F (LMP 12/15/18, A1) with HIV presents to the ED c/o acute onset of constant and severe (5/10) pelvic cramping x2 days. She also reports vaginal discharge x1 day. She is concerned for possible STD. She is compliant with her HIV medication. She was last tested fpr gonorrhea and chlamydia in November. She denies fever, chills, diaphoresis, nausea, emesis, diarrhea, chest pain, abdominal pain, vaginal bleeding, dysuria, difficulty urinating and rash. No prior tx.        The history is provided by the patient. No  was used.     Review of patient's allergies indicates:  No Known Allergies  Past Medical History:   Diagnosis Date    HIV (human immunodeficiency virus infection)      Past Surgical History:   Procedure Laterality Date     SECTION, LOW TRANSVERSE      DELIVERY- SECTION Bilateral 2017    Performed by Jocelyn Chan MD at [facility] L[facility]D OR    miscarriage        Family History   Problem Relation Age of Onset    Breast cancer Neg Hx     Colon cancer Neg Hx     Ovarian cancer Neg Hx      Social History     Tobacco Use    Smoking status: Never Smoker    Smokeless tobacco: Never Used   Substance Use Topics    Alcohol use: No    Drug use: No     Review of Systems   Constitutional: Negative for chills, diaphoresis and fever.   HENT: Negative for congestion, rhinorrhea and sore throat.    Eyes: Negative for redness.   Respiratory: Negative for cough and shortness of breath.    Cardiovascular: Negative for chest pain.   Gastrointestinal: " Negative for abdominal pain, constipation, diarrhea, nausea and vomiting.   Genitourinary: Positive for pelvic pain and vaginal discharge. Negative for decreased urine volume, dysuria, frequency, urgency, vaginal bleeding and vaginal pain.   Musculoskeletal: Negative for back pain and neck pain.   Skin: Negative for rash.   Neurological: Negative for headaches.   Psychiatric/Behavioral: Negative for confusion. The patient is not nervous/anxious.        Physical Exam     Initial Vitals [01/09/19 2211]   BP Pulse Resp Temp SpO2   121/71 98 20 98.6 °F (37 °C) 100 %      MAP       --         Physical Exam    Nursing note and vitals reviewed.  Constitutional: Vital signs are normal. She appears well-developed and well-nourished. She is not diaphoretic. She is cooperative.  Non-toxic appearance. She does not have a sickly appearance. She does not appear ill. No distress.   HENT:   Head: Normocephalic and atraumatic.   Right Ear: Tympanic membrane, external ear and ear canal normal.   Left Ear: Tympanic membrane, external ear and ear canal normal.   Nose: Nose normal.   Mouth/Throat: Uvula is midline, oropharynx is clear and moist and mucous membranes are normal. No oropharyngeal exudate.   Eyes: Conjunctivae, EOM and lids are normal. Pupils are equal, round, and reactive to light.   Neck: Trachea normal, normal range of motion, full passive range of motion without pain and phonation normal. Neck supple.   Cardiovascular: Normal rate, regular rhythm, normal heart sounds and intact distal pulses. Exam reveals no gallop and no friction rub.    No murmur heard.  Pulmonary/Chest: Effort normal and breath sounds normal. No respiratory distress. She has no decreased breath sounds. She has no wheezes. She has no rhonchi. She has no rales.   Abdominal: Soft. Normal appearance and bowel sounds are normal. She exhibits no distension and no mass. There is no tenderness. There is no rigidity, no rebound, no guarding and no CVA  tenderness.   Genitourinary: Pelvic exam was performed with patient supine. There is no rash, tenderness, lesion or injury on the right labia. There is no rash, tenderness, lesion or injury on the left labia. Cervix exhibits no motion tenderness, no discharge and no friability. Right adnexum displays no tenderness. Left adnexum displays no tenderness. No erythema, tenderness or bleeding in the vagina. No foreign body in the vagina. No signs of injury around the vagina. Vaginal discharge found.   Genitourinary Comments: Cervical os closed. Performed with Candace Avila LPN at bedside.   Musculoskeletal: Normal range of motion.   Neurological: She is alert and oriented to person, place, and time. She has normal strength.   Skin: Skin is warm and dry. Capillary refill takes less than 2 seconds. No rash noted.   Psychiatric: She has a normal mood and affect. Her speech is normal and behavior is normal. Judgment and thought content normal. Cognition and memory are normal.         ED Course   Procedures  Labs Reviewed   URINALYSIS, REFLEX TO URINE CULTURE - Abnormal; Notable for the following components:       Result Value    Appearance, UA Hazy (*)     Occult Blood UA 1+ (*)     Urobilinogen, UA 2.0-3.0 (*)     Leukocytes, UA Trace (*)     All other components within normal limits    Narrative:     Preferred Collection Type->Urine, Clean Catch   URINALYSIS MICROSCOPIC - Abnormal; Notable for the following components:    Bacteria, UA Many (*)     All other components within normal limits    Narrative:     Preferred Collection Type->Urine, Clean Catch   VAGINAL SCREEN - Abnormal; Notable for the following components:    Clue Cells, Wet Prep Occasional (*)     WBC - Vaginal Screen Occasional (*)     Bacteria - Vaginal Screen Few (*)     All other components within normal limits   CBC W/ AUTO DIFFERENTIAL - Abnormal; Notable for the following components:    RBC 3.98 (*)     Hematocrit 35.2 (*)     All other components within  normal limits   COMPREHENSIVE METABOLIC PANEL - Abnormal; Notable for the following components:    CO2 22 (*)     ALT 9 (*)     All other components within normal limits   POCT URINE PREGNANCY - Abnormal; Notable for the following components:    POC Preg Test, Ur Positive (*)     All other components within normal limits   CULTURE, URINE   C. TRACHOMATIS/N. GONORRHOEAE BY AMP DNA   HCG, QUANTITATIVE, PREGNANCY   GROUP & RH          Imaging Results          US OB Less Than 14 Wks with Transvag(xpd (Final result)  Result time 01/10/19 02:24:56    Final result by Patricia Vick MD (01/10/19 02:24:56)                 Narrative:    EXAMINATION:  US OB LESS THAN 14 WKS WITH TRANSVAGINAL (XPD)    CLINICAL HISTORY:  Pelvic Discomfort;    TECHNIQUE:  Transabdominal sonography of the pelvis was performed, followed by transvaginal sonography to better evaluate the uterus and ovaries.    COMPARISON:  None.    FINDINGS:  Intrauterine gestation(s): No fetal pole detected.  Retroverted uterus.    Mean gestational sac diameter: 0.44 cm    Yolk sac: None    Crown-rump length (CRL): None available.    Cardiac activity: None available.    Subchorionic hemorrhage: None.    Right ovary: 3.1 x 1.9 x 3.5 cm.    Left ovary: 2.9 x 2.7 x 1.3 cm.    Miscellaneous: Free fluid is seen in the arm both adnexal regions.      Five week gestational sac seen within the retroverted uterus.  Free pelvic fluid.  No fetal pole at this time.  Consider follow-up serial and beta HCG your ultrasound is needed.      Electronically signed by: Patricia Vick  Date:    01/10/2019  Time:    02:24                                     APC / Resident Notes:   This is an evaluation of a gravid 23 y.o. Female with HIV that presents to the Emergency Department for pelvic discomfort. LMP 12/15/18, A1. She reports pelvic discomfort and vaginal discharge for several days.  She reports concern for STD.  She reports compliance with her HIV medications.  She denies  any further symptoms.    Exam findings: Patient is non-toxic, afebrile and well appearing.  The abdomen is soft and nontender. The abdomen is nondistended.  Bowel sounds appreciated.  No peritoneal signs. No CVA tenderness.  exam: + white vaginal discharge, no bleeding. Cervical os closed. No adnexal tenderness. No cervical discharge. Performed with Candace Avila LPN at bedside.    If available, past records have been reviewed.  Vitals are reassuring.  Results:   UPT positive  Urinalysis revealing for infection.  Culture pending  CBC unrevealing for leukocytosis or anemia.  HCG quantitative 2058  ABO Rh A-positive  Vaginal screen revealing for clue cells.  GC culture pending  Transvaginal ultrasound shows 5 week gestational sac seen within the retroverted uterus.  There is free pelvic fluid.  There is no pulp fetal pole seen at this time, likely due to gestational age.    My overall impression:  Pelvic discomfort, intrauterine pregnancy, acute cystitis, bacterial vaginosis, STD exposure  DDx:  Pelvic discomfort, intrauterine pregnancy, ectopic pregnancy, acute cystitis, STI, BV, vaginitis, UTI, other    ED course:  Tylenol given for pain. I will treat prophylactically with azithromycin and ceftriaxone.  I will recommend close follow-up with OB gyn for prenatal care.  I will prescribe Keflex, Metrogel and prenatal vitamins.  Patient is stable for discharge.  ED return precautions given.    The diagnosis and treatment plan have been discussed with the patient. All questions and concerns have been addressed. Patient expressed understanding. An educational information sheet was given to the patient prior to discharge.     Candace Lester PA-C         Scribe Attestation:   Scribe #1: I performed the above scribed service and the documentation accurately describes the services I performed. I attest to the accuracy of the note.    Attending Attestation:           Physician Attestation for Scribe:  Physician Attestation  Statement for Scribe #1: I, Candace Gordon PA-C, reviewed documentation, as scribed by Ang Nieto in my presence, and it is both accurate and complete.                    Clinical Impression:   The primary encounter diagnosis was Pelvic pain in pregnancy. Diagnoses of Bacterial vaginosis, Acute cystitis without hematuria, Intrauterine pregnancy, and Exposure to STD were also pertinent to this visit.      Disposition:   Disposition: Discharged  Condition: Stable                        Candace Gordon PA-C  01/10/19 0603

## 2019-01-11 LAB — BACTERIA UR CULT: NORMAL

## 2019-01-27 ENCOUNTER — HOSPITAL ENCOUNTER (EMERGENCY)
Facility: HOSPITAL | Age: 24
Discharge: HOME | End: 2019-01-27
Attending: EMERGENCY MEDICINE
Payer: MEDICAID

## 2019-01-27 VITALS
OXYGEN SATURATION: 100 % | RESPIRATION RATE: 20 BRPM | DIASTOLIC BLOOD PRESSURE: 79 MMHG | HEIGHT: 66 IN | TEMPERATURE: 98 F | HEART RATE: 80 BPM | WEIGHT: 139 LBS | SYSTOLIC BLOOD PRESSURE: 124 MMHG | BODY MASS INDEX: 22.34 KG/M2

## 2019-01-27 DIAGNOSIS — O26.891 ABDOMINAL PAIN DURING PREGNANCY IN FIRST TRIMESTER: Primary | ICD-10-CM

## 2019-01-27 DIAGNOSIS — R10.9 ABDOMINAL PAIN DURING PREGNANCY IN FIRST TRIMESTER: Primary | ICD-10-CM

## 2019-01-27 DIAGNOSIS — R10.9 ABDOMINAL PAIN: ICD-10-CM

## 2019-01-27 LAB
B-HCG UR QL: POSITIVE
BACTERIA GENITAL QL WET PREP: ABNORMAL
BILIRUB UR QL STRIP: NEGATIVE
CLARITY UR: CLEAR
CLUE CELLS VAG QL WET PREP: ABNORMAL
COLOR UR: YELLOW
CTP QC/QA: YES
FILAMENT FUNGI VAG WET PREP-#/AREA: ABNORMAL
GLUCOSE UR QL STRIP: NEGATIVE
HCG INTACT+B SERPL-ACNC: NORMAL MIU/ML
HGB UR QL STRIP: ABNORMAL
KETONES UR QL STRIP: ABNORMAL
LEUKOCYTE ESTERASE UR QL STRIP: NEGATIVE
MICROSCOPIC COMMENT: ABNORMAL
NITRITE UR QL STRIP: NEGATIVE
PH UR STRIP: 6 [PH] (ref 5–8)
PROT UR QL STRIP: NEGATIVE
RBC #/AREA URNS HPF: 10 /HPF (ref 0–4)
SP GR UR STRIP: 1.03 (ref 1–1.03)
SPECIMEN SOURCE: ABNORMAL
SQUAMOUS #/AREA URNS HPF: 4 /HPF
T VAGINALIS GENITAL QL WET PREP: ABNORMAL
URN SPEC COLLECT METH UR: ABNORMAL
UROBILINOGEN UR STRIP-ACNC: ABNORMAL EU/DL
WBC #/AREA VAG WET PREP: ABNORMAL
YEAST GENITAL QL WET PREP: ABNORMAL

## 2019-01-27 PROCEDURE — 99283 EMERGENCY DEPT VISIT LOW MDM: CPT

## 2019-01-27 PROCEDURE — 87491 CHLMYD TRACH DNA AMP PROBE: CPT

## 2019-01-27 PROCEDURE — 84702 CHORIONIC GONADOTROPIN TEST: CPT

## 2019-01-27 PROCEDURE — 25000003 PHARM REV CODE 250: Performed by: EMERGENCY MEDICINE

## 2019-01-27 PROCEDURE — 81000 URINALYSIS NONAUTO W/SCOPE: CPT

## 2019-01-27 PROCEDURE — 81025 URINE PREGNANCY TEST: CPT | Performed by: EMERGENCY MEDICINE

## 2019-01-27 PROCEDURE — 87210 SMEAR WET MOUNT SALINE/INK: CPT

## 2019-01-27 RX ORDER — ACETAMINOPHEN 325 MG/1
650 TABLET ORAL EVERY 6 HOURS PRN
Qty: 15 TABLET | Refills: 0 | Status: SHIPPED | OUTPATIENT
Start: 2019-01-27 | End: 2021-06-26

## 2019-01-27 RX ORDER — ACETAMINOPHEN 325 MG/1
650 TABLET ORAL
Status: COMPLETED | OUTPATIENT
Start: 2019-01-27 | End: 2019-01-27

## 2019-01-27 RX ADMIN — ACETAMINOPHEN 650 MG: 325 TABLET ORAL at 07:01

## 2019-01-28 NOTE — ED NOTES
Spoke with Cyndi Ultrasound tech and she stated it will be 20-25 minutes before she arrives. . Dr. Johnson notified

## 2019-01-28 NOTE — ED TRIAGE NOTES
[age] y.o. Female presents to the ED with chief complaint of abdominal cramps. Patient has hx of HIV and is currently 7 week pregnant. Patient reports last menstrual cycle was Dec 13, 2018. Patient denies taking any medication for symptoms. Patient resting in bed in NAD. Side rails up x2.

## 2019-01-28 NOTE — ED PROVIDER NOTES
Encounter Date: 2019    SCRIBE #1 NOTE: I, Maninder Christine, am scribing for, and in the presence of,  Nicolas Johnson MD. I have scribed the following portions of the note - Other sections scribed: HPI, ROS and PE.       History     Chief Complaint   Patient presents with    Pregnant Abdominal Cramping     reports abdominal cramping x 3 days; pt eating cracker in triage; denies N/V/D; reports 7 wks pregnant; has not seen OB yet     CC: Abdominal Pain    HPI: This is a 23 y.o. A1 female who presents with a 2-day history of intermittent abdominal cramping. Patient reports having similar symptoms with past pregnancies. No vaginal bleeding, vaginal discharge, fever, frequency, dysuria. No modifying factors. No further symptoms.      The history is provided by the patient. No  was used.     Review of patient's allergies indicates:  No Known Allergies  Past Medical History:   Diagnosis Date    HIV (human immunodeficiency virus infection)      Past Surgical History:   Procedure Laterality Date     SECTION, LOW TRANSVERSE      DELIVERY- SECTION Bilateral 2017    Performed by Jocelyn Chan MD at [facility] L&D OR    miscarriage        Family History   Problem Relation Age of Onset    Breast cancer Neg Hx     Colon cancer Neg Hx     Ovarian cancer Neg Hx      Social History     Tobacco Use    Smoking status: Never Smoker    Smokeless tobacco: Never Used   Substance Use Topics    Alcohol use: No    Drug use: No     Review of Systems   Constitutional: Negative for chills, diaphoresis and fever.   HENT: Negative for rhinorrhea and sore throat.    Eyes: Negative for visual disturbance.   Respiratory: Negative for cough and shortness of breath.    Cardiovascular: Negative for chest pain.   Gastrointestinal: Positive for abdominal pain. Negative for constipation, diarrhea, nausea and vomiting.   Genitourinary: Negative for dysuria.   Neurological: Negative for headaches.   All other  systems reviewed and are negative.      Physical Exam     Initial Vitals [01/27/19 1844]   BP Pulse Resp Temp SpO2   (!) 124/59 90 20 98.3 °F (36.8 °C) 100 %      MAP       --         Physical Exam    Nursing note and vitals reviewed.  Constitutional: She is not diaphoretic. No distress.   HENT:   Head: Normocephalic and atraumatic.   Mouth/Throat: Oropharynx is clear and moist.   Eyes: EOM are normal. Pupils are equal, round, and reactive to light. No scleral icterus.   Neck: Normal range of motion. Neck supple. No JVD present.   Cardiovascular: Normal rate and regular rhythm.   Pulmonary/Chest: Breath sounds normal. No stridor. No respiratory distress.   Abdominal: Soft. Bowel sounds are normal. She exhibits no distension. There is no tenderness.   Genitourinary: Vagina normal. No vaginal discharge found.   Musculoskeletal: Normal range of motion. She exhibits no edema or tenderness.   Neurological: She is alert and oriented to person, place, and time. She has normal strength. No cranial nerve deficit or sensory deficit.   Skin: Skin is warm and dry.   Psychiatric: She has a normal mood and affect.         ED Course   Procedures  Labs Reviewed   URINALYSIS, REFLEX TO URINE CULTURE - Abnormal; Notable for the following components:       Result Value    Ketones, UA 1+ (*)     Occult Blood UA 2+ (*)     Urobilinogen, UA 4.0-6.0 (*)     All other components within normal limits    Narrative:     Preferred Collection Type->Urine, Clean Catch   VAGINAL SCREEN - Abnormal; Notable for the following components:    Bacteria - Vaginal Screen Rare (*)     All other components within normal limits   URINALYSIS MICROSCOPIC - Abnormal; Notable for the following components:    RBC, UA 10 (*)     All other components within normal limits    Narrative:     Preferred Collection Type->Urine, Clean Catch   POCT URINE PREGNANCY - Abnormal; Notable for the following components:    POC Preg Test, Ur Positive (*)     All other components  within normal limits   C. TRACHOMATIS/N. GONORRHOEAE BY AMP DNA   HCG, QUANTITATIVE, PREGNANCY          Imaging Results          US OB Less Than 14 Wks First Gestation (Final result)  Result time 19 21:27:15    Final result by Juan C Miranda MD (19 21:27:15)                 Impression:      1. Single living intrauterine gestation, crown-rump length correlates to an estimated gestational age of 7 weeks 2 days, cardiac activity documented at 152 beats per minute.  2. There may be trace fluid in the cervix.  3. Trace fluid in the pelvis.      Electronically signed by: Juan C Miranda MD  Date:    2019  Time:    21:27             Narrative:    EXAMINATION:  US OB LESS THAN 14 WEEKS FIRST GESTATION    CLINICAL HISTORY:  Unspecified abdominal pain    TECHNIQUE:  Transabdominal transvaginal imaging were performed of the pelvis.    COMPARISON:  01/10/2019    FINDINGS:  There is a single living intrauterine gestation, crown-rump length correlates to an estimated gestational age of 7 weeks 2 days, cardiac activity documented at 152 beats per minute.  There is a cervical nabothian cyst, and trace fluid in the cervix.  The uterine parenchyma is otherwise grossly homogeneous.  There is a suspected anterior Caesarean section scar.    The right ovary measures approximately 2.9 x 3.4 x 2.2 cm, and contains a probable corpus luteal cyst measuring 1.7 x 1.6 x 1.3 cm.  The left ovary measures approximately 2.2 x 1.4 x 2.8 cm.  There is a small amount of free fluid in the pelvis.  Arterial and venous flow is documented to the ovaries bilaterally.                                 Medical Decision Making:   Initial Assessment:   Pt presents 2/2 vaginal bleeding and suprapubic pain concerning for threatened . Patient is 7 weeks by LMP. Patient's beta is 34k. Patient received tylenol for pain. Patient's Rh status is positive.     US Showed IUP with heart beat.   Labs reviewed and unremarkable    Considered on  differential:  TOA: Unlikely, no fevers, adnexal mass, and US negative for TOA  Torsion: Unlikely. HPI not consistent. US negative for torsion  Ectopic: IUP confirmed in patient.  PCOS: less likely due to positive pregnancy  Vaginal trauma: unlikely due to physical exam  Urinary tract infection: UA negative  Inevitable : Os closed  Complete : no report of tissue, US not consistent.     Pain likely pain due to early pregnancy and growth of child. Instructions on only taking tylenol for pain. Already has OBGYN appointment set up. I discussed with the patient the diagnosis, treatment plan, indications for return to the emergency department, and for expected follow-up. The patient verbalized an understanding. The patient is asked if there are any questions or concerns. We discuss the case, until all issues are addressed to the patient's satisfaction. Patient understands and is agreeable to the plan.   Nicolas Johnson      Clinical Tests:   Lab Tests: Reviewed and Ordered  Radiological Study: Ordered and Reviewed            Scribe Attestation:   Scribe #1: I performed the above scribed service and the documentation accurately describes the services I performed. I attest to the accuracy of the note.    Attending Attestation:           Physician Attestation for Scribe:  Physician Attestation Statement for Scribe #1: I, Nicolas Johnson MD, reviewed documentation, as scribed by Maninder Christine in my presence, and it is both accurate and complete.                    Clinical Impression:   The primary encounter diagnosis was Abdominal pain during pregnancy in first trimester. A diagnosis of Abdominal pain was also pertinent to this visit.                             Nicolas Johnson MD  19 [phone]

## 2019-01-29 LAB
C TRACH DNA SPEC QL NAA+PROBE: NOT DETECTED
N GONORRHOEA DNA SPEC QL NAA+PROBE: NOT DETECTED

## 2019-02-28 ENCOUNTER — HOSPITAL ENCOUNTER (EMERGENCY)
Facility: HOSPITAL | Age: 24
Discharge: HOME | End: 2019-02-28
Attending: EMERGENCY MEDICINE
Payer: MEDICAID

## 2019-02-28 VITALS
SYSTOLIC BLOOD PRESSURE: 130 MMHG | TEMPERATURE: 99 F | OXYGEN SATURATION: 98 % | BODY MASS INDEX: 22.5 KG/M2 | HEART RATE: 74 BPM | DIASTOLIC BLOOD PRESSURE: 62 MMHG | RESPIRATION RATE: 18 BRPM | WEIGHT: 140 LBS | HEIGHT: 66 IN

## 2019-02-28 DIAGNOSIS — R82.71 BACTERIURIA IN PREGNANCY: ICD-10-CM

## 2019-02-28 DIAGNOSIS — B96.89 BV (BACTERIAL VAGINOSIS): ICD-10-CM

## 2019-02-28 DIAGNOSIS — Z3A.12 12 WEEKS GESTATION OF PREGNANCY: ICD-10-CM

## 2019-02-28 DIAGNOSIS — O99.891 BACTERIURIA IN PREGNANCY: ICD-10-CM

## 2019-02-28 DIAGNOSIS — R10.30 LOWER ABDOMINAL PAIN: Primary | ICD-10-CM

## 2019-02-28 DIAGNOSIS — N76.0 BV (BACTERIAL VAGINOSIS): ICD-10-CM

## 2019-02-28 DIAGNOSIS — O20.0 THREATENED ABORTION: ICD-10-CM

## 2019-02-28 LAB
BACTERIA #/AREA URNS HPF: ABNORMAL /HPF
BACTERIA GENITAL QL WET PREP: ABNORMAL
BILIRUB UR QL STRIP: NEGATIVE
CLARITY UR: CLEAR
CLUE CELLS VAG QL WET PREP: ABNORMAL
COLOR UR: YELLOW
FILAMENT FUNGI VAG WET PREP-#/AREA: ABNORMAL
GLUCOSE UR QL STRIP: NEGATIVE
HGB UR QL STRIP: ABNORMAL
KETONES UR QL STRIP: NEGATIVE
LEUKOCYTE ESTERASE UR QL STRIP: NEGATIVE
MICROSCOPIC COMMENT: ABNORMAL
NITRITE UR QL STRIP: NEGATIVE
PH UR STRIP: 6 [PH] (ref 5–8)
PROT UR QL STRIP: NEGATIVE
RBC #/AREA URNS HPF: 4 /HPF (ref 0–4)
SP GR UR STRIP: 1.03 (ref 1–1.03)
SPECIMEN SOURCE: ABNORMAL
SQUAMOUS #/AREA URNS HPF: 5 /HPF
T VAGINALIS GENITAL QL WET PREP: ABNORMAL
URN SPEC COLLECT METH UR: ABNORMAL
UROBILINOGEN UR STRIP-ACNC: ABNORMAL EU/DL
WBC #/AREA VAG WET PREP: ABNORMAL
YEAST GENITAL QL WET PREP: ABNORMAL

## 2019-02-28 PROCEDURE — 25000003 PHARM REV CODE 250: Performed by: PHYSICIAN ASSISTANT

## 2019-02-28 PROCEDURE — 87210 SMEAR WET MOUNT SALINE/INK: CPT

## 2019-02-28 PROCEDURE — 87086 URINE CULTURE/COLONY COUNT: CPT

## 2019-02-28 PROCEDURE — 99284 EMERGENCY DEPT VISIT MOD MDM: CPT

## 2019-02-28 PROCEDURE — 87491 CHLMYD TRACH DNA AMP PROBE: CPT

## 2019-02-28 PROCEDURE — 81000 URINALYSIS NONAUTO W/SCOPE: CPT

## 2019-02-28 RX ORDER — ACETAMINOPHEN 325 MG/1
325 TABLET ORAL EVERY 6 HOURS PRN
Refills: 0 | COMMUNITY
Start: 2019-02-28 | End: 2021-06-26

## 2019-02-28 RX ORDER — EMTRICITABINE AND TENOFOVIR DISOPROXIL FUMARATE 200; 300 MG/1; MG/1
1 TABLET, FILM COATED ORAL DAILY
COMMUNITY

## 2019-02-28 RX ORDER — METRONIDAZOLE 500 MG/1
500 TABLET ORAL EVERY 12 HOURS
Qty: 14 TABLET | Refills: 0 | Status: SHIPPED | OUTPATIENT
Start: 2019-02-28 | End: 2019-03-07

## 2019-02-28 RX ORDER — ATAZANAVIR 300 MG/1
300 CAPSULE ORAL
COMMUNITY

## 2019-02-28 RX ORDER — ACETAMINOPHEN 500 MG
500 TABLET ORAL
Status: COMPLETED | OUTPATIENT
Start: 2019-02-28 | End: 2019-02-28

## 2019-02-28 RX ORDER — NITROFURANTOIN 25; 75 MG/1; MG/1
100 CAPSULE ORAL 2 TIMES DAILY
Qty: 14 CAPSULE | Refills: 0 | Status: SHIPPED | OUTPATIENT
Start: 2019-02-28 | End: 2019-03-07

## 2019-02-28 RX ADMIN — ACETAMINOPHEN 500 MG: 500 TABLET, FILM COATED ORAL at 08:02

## 2019-03-01 LAB
C TRACH DNA SPEC QL NAA+PROBE: NOT DETECTED
N GONORRHOEA DNA SPEC QL NAA+PROBE: NOT DETECTED

## 2019-03-01 NOTE — ED TRIAGE NOTES
Pt reports being 3 months pregnant. Pt c/o lower abdominal pain x2 days, vaginal spotting x1 day. Denies dysuria, fever, chills. Also c/o intermittent N/V. Pain is 5/10. Denies taking pain meds PTA

## 2019-03-01 NOTE — ED PROVIDER NOTES
Encounter Date: 2019  SORT:   24 y/o pregnant female with confirmed IUP and hx of HIV presenting for evaluation of 2 day history of abdominal cramping and vaginal spotting x 1 day. Denies dizziness, lightheadedness. OBGYN Dr. Walton. Initial orders placed. JANELLE Thompson PA-C      History   No chief complaint on file.    23-year-old female with HIV (last viral load undetectable),  approximately 12 weeks gestational age, who complains of 2 days of lower abdominal pain and spotting that started yesterday.  She reports pain is worse when standing and walking around.  She denies dysuria, vaginal discharge, changes in bowel habits, nausea vomiting right now.  She has had issues with nausea vomiting with this pregnancy, and has been noncompliant with HIV medications due to this.  She has had prenatal care with this pregnancy, and was directed to the ED for evaluation by her OBGYN.  Ultrasound done previously shows single IUP.          Review of patient's allergies indicates:  No Known Allergies  Past Medical History:   Diagnosis Date    HIV (human immunodeficiency virus infection)      Past Surgical History:   Procedure Laterality Date     SECTION, LOW TRANSVERSE      DELIVERY- SECTION Bilateral 2017    Performed by [facility] at [facility] L[facility]D [facility]    miscarriage        Family History   Problem Relation Age of Onset    Breast cancer Neg Hx     Colon cancer Neg Hx     Ovarian cancer Neg Hx      Social History     Tobacco Use    Smoking status: Never Smoker    Smokeless tobacco: Never Used   Substance Use Topics    Alcohol use: No    Drug use: No     Review of Systems   Constitutional: Negative for fever.   HENT: Negative for sore throat.    Respiratory: Negative for shortness of breath.    Cardiovascular: Negative for chest pain.   Gastrointestinal: Positive for abdominal pain. Negative for nausea.   Genitourinary: Positive for vaginal bleeding. Negative for dysuria.    Musculoskeletal: Negative for back pain.   Skin: Negative for rash.   Neurological: Negative for weakness.   Hematological: Does not bruise/bleed easily.       Physical Exam     Initial Vitals   BP Pulse Resp Temp SpO2   -- -- -- -- --      MAP       --         Physical Exam    Vitals reviewed.  Constitutional: She appears well-developed and well-nourished. She is not diaphoretic. No distress.   HENT:   Head: Normocephalic and atraumatic.   Right Ear: External ear normal.   Left Ear: External ear normal.   Nose: Nose normal.   Eyes: Conjunctivae are normal. No scleral icterus.   Neck: Normal range of motion. Neck supple.   Cardiovascular: Normal rate, regular rhythm, normal heart sounds and intact distal pulses.   Pulmonary/Chest: Breath sounds normal. No respiratory distress. She has no wheezes. She has no rhonchi. She has no rales. She exhibits no tenderness.   Abdominal: Soft. She exhibits no mass. There is no tenderness. There is no rebound and no guarding.   Gravid abdomen consistent with gestational age   Genitourinary: Vagina normal. Cervix exhibits no motion tenderness, no discharge and no friability. Right adnexum displays no mass, no tenderness and no fullness. Left adnexum displays no mass, no tenderness and no fullness. No erythema, tenderness or bleeding in the vagina. No foreign body in the vagina. No signs of injury around the vagina. No vaginal discharge found.   Genitourinary Comments: Chaperoned by Rebecca PEDRAZA   Musculoskeletal: Normal range of motion.   Neurological: She is alert and oriented to person, place, and time.   Skin: Skin is warm and dry.         ED Course   Procedures  Labs Reviewed - No data to display       Imaging Results    None          Medical Decision Making:   ED Management:  23-year-old pregnant female with lower abdominal pain and vaginal spotting.  Blood type is Rh positive. Previous ultrasound shows single live IUP.   Cervical os is closed.   No evidence of PID on exam.   Gonorrhea and chlamydia sent.  UA without evidence for infection.  Bacteria present.  Will treat with Macrobid and send for culture.  Wet prep with clue cells.  Will treat with Flagyl.  Patient treated with Tylenol in the ED.  She is tolerating p.o., vital signs within normal limits. No bleeding on exam.  Will instruct pelvic rest and close follow-up with OBGYN.                      Clinical Impression:       ICD-10-CM ICD-9-CM   1. Lower abdominal pain R10.30 789.09   2. 12 weeks gestation of pregnancy Z3A.12 V22.2   3. Bacteriuria in pregnancy O99.89 646.50    R82.71    4. BV (bacterial vaginosis) N76.0 616.10    B96.89 041.9   5. Threatened  O20.0 640.00                                Arnie Diamond PA-C  19 [phone]

## 2019-03-02 LAB — BACTERIA UR CULT: NORMAL

## 2019-06-03 ENCOUNTER — HOSPITAL ENCOUNTER (OUTPATIENT)
Facility: HOSPITAL | Age: 24
Discharge: HOME | End: 2019-06-03
Attending: OBSTETRICS & GYNECOLOGY | Admitting: OBSTETRICS & GYNECOLOGY
Payer: MEDICAID

## 2019-06-03 ENCOUNTER — TELEPHONE (OUTPATIENT)
Dept: OBSTETRICS AND GYNECOLOGY | Facility: CLINIC | Age: 24
End: 2019-06-03

## 2019-06-03 VITALS
DIASTOLIC BLOOD PRESSURE: 64 MMHG | OXYGEN SATURATION: 100 % | RESPIRATION RATE: 16 BRPM | SYSTOLIC BLOOD PRESSURE: 109 MMHG | HEART RATE: 71 BPM

## 2019-06-03 DIAGNOSIS — R10.9 ABDOMINAL PAIN AFFECTING PREGNANCY: ICD-10-CM

## 2019-06-03 DIAGNOSIS — O26.899 ABDOMINAL PAIN AFFECTING PREGNANCY: ICD-10-CM

## 2019-06-03 LAB
BACTERIA #/AREA URNS HPF: ABNORMAL /HPF
BILIRUB UR QL STRIP: NEGATIVE
CLARITY UR: ABNORMAL
COLOR UR: YELLOW
GLUCOSE UR QL STRIP: NEGATIVE
HGB UR QL STRIP: ABNORMAL
HYALINE CASTS #/AREA URNS LPF: 0 /LPF
KETONES UR QL STRIP: ABNORMAL
LEUKOCYTE ESTERASE UR QL STRIP: ABNORMAL
MICROSCOPIC COMMENT: ABNORMAL
NITRITE UR QL STRIP: NEGATIVE
PH UR STRIP: 6 [PH] (ref 5–8)
PROT UR QL STRIP: ABNORMAL
RBC #/AREA URNS HPF: 10 /HPF (ref 0–4)
SP GR UR STRIP: 1.02 (ref 1–1.03)
SQUAMOUS #/AREA URNS HPF: 5 /HPF
URN SPEC COLLECT METH UR: ABNORMAL
UROBILINOGEN UR STRIP-ACNC: ABNORMAL EU/DL
WBC #/AREA URNS HPF: 95 /HPF (ref 0–5)

## 2019-06-03 PROCEDURE — 99211 OFF/OP EST MAY X REQ PHY/QHP: CPT

## 2019-06-03 PROCEDURE — 87086 URINE CULTURE/COLONY COUNT: CPT

## 2019-06-03 PROCEDURE — 87186 SC STD MICRODIL/AGAR DIL: CPT

## 2019-06-03 PROCEDURE — 81000 URINALYSIS NONAUTO W/SCOPE: CPT

## 2019-06-03 PROCEDURE — 87088 URINE BACTERIA CULTURE: CPT

## 2019-06-03 PROCEDURE — 87077 CULTURE AEROBIC IDENTIFY: CPT

## 2019-06-03 RX ORDER — ACETAMINOPHEN 500 MG
500 TABLET ORAL EVERY 6 HOURS PRN
Status: DISCONTINUED | OUTPATIENT
Start: 2019-06-03 | End: 2019-06-03 | Stop reason: HOSPADM

## 2019-06-03 RX ORDER — ONDANSETRON 8 MG/1
8 TABLET, ORALLY DISINTEGRATING ORAL EVERY 8 HOURS PRN
Status: DISCONTINUED | OUTPATIENT
Start: 2019-06-03 | End: 2019-06-03 | Stop reason: HOSPADM

## 2019-06-03 NOTE — NURSING
"Pt to unit with c/o "bladder pain," denies bleeding or lof. +FM. Clean catch urine specimen obtained. EFM placed. Pt states she sees Dr Walton at [facility] and that she is HIV +, reports being compliant with medications. Denies other history. POC reviewed with pt, verbalizes understanding.  "

## 2019-06-03 NOTE — DISCHARGE INSTRUCTIONS
Home Undelivered Discharge Instructions    After Discharge Orders:    No future appointments.    Take antibiotic as prescribed (2 times a day for one week). Prescription sent to [facility] [address].  Current Discharge Medication List      CONTINUE these medications which have NOT CHANGED    Details   !! acetaminophen (TYLENOL) 325 MG tablet Take 2 tablets (650 mg total) by mouth every 6 (six) hours as needed for Pain.  Qty: 15 tablet, Refills: 0      !! acetaminophen (TYLENOL) 325 MG tablet Take 1 tablet (325 mg total) by mouth every 6 (six) hours as needed for Pain.  Refills: 0      atazanavir (REYATAZ) 300 MG Cap Take 300 mg by mouth daily with breakfast.      sfzcvah-jlnr-ymbgao-tenofo ala [phone] mg Tab Take 1 tablet by mouth once daily.  Qty: 30 tablet, Refills: 1    Associated Diagnoses: HIV (human immunodeficiency virus infection)      emtricitabine-tenofovir 200-300 mg (TRUVADA) 200-300 mg Tab Take 1 tablet by mouth once daily.      prenatal 21-iron fu-folic acid (PRENATAL COMPLETE) 14 mg iron- 400 mcg Tab Take 1 tablet by mouth once daily.  Qty: 30 tablet, Refills: 9      ritonavir (NORVIR) 100 mg Cap Take 100 mg by mouth 2 (two) times daily.      traMADol (ULTRAM) 50 mg tablet Take 50 mg by mouth every 6 (six) hours.       !! - Potential duplicate medications found. Please discuss with provider.                  · Diet:  normal diet as tolerated    · Rest: normal activity as tolerated    Call physician or midwife, return to Labor and Delivery, call 911, or go to the nearest Emergency Room if: increased leakage or fluid, contractions 2 to 4 minutes apart for 1 hour, decreased fetal movement, persistent low back pain or cramping, bleeding from vaginal area, difficulty urinating, pain with urination, difficulty breathing, new calf pain, persistent headache or vision change

## 2019-06-03 NOTE — NURSING
Pt given discharge instructions, stressed importance of taking antibiotics as prescribed, verbalizes understanding. Ambulated off unit with belongings.

## 2019-06-03 NOTE — TELEPHONE ENCOUNTER
Call to pharmacy to verify Rx is safe for patient.     ----- Message from Beth Merrillaux sent at 6/3/2019  2:24 PM CDT -----  Contact: Bob Dumont  Type: Patient Call Back    Who called: Daniela Kat    What is the request in detail: Macrobid was called in for this patient  Please clarify if the patient can take this during pregnancy. They are receiving a major interaction clarification needed    Can the clinic reply by [facility]?  no    Would the patient rather a call back or a response via [facility]? Call    Best call back number: [phone]

## 2019-06-04 ENCOUNTER — TELEPHONE (OUTPATIENT)
Dept: OBSTETRICS AND GYNECOLOGY | Facility: CLINIC | Age: 24
End: 2019-06-04

## 2019-06-04 NOTE — TELEPHONE ENCOUNTER
Patient does not want to transfer care.  No appt made.     ----- Message from Pete Matthew MD sent at 6/4/2019  9:08 AM CDT -----  Not my patient.  Needs to come in.  No established doctor-patient relationship.

## 2019-06-05 ENCOUNTER — HOSPITAL ENCOUNTER (OUTPATIENT)
Facility: HOSPITAL | Age: 24
Discharge: HOME | End: 2019-06-05
Attending: OBSTETRICS & GYNECOLOGY | Admitting: OBSTETRICS & GYNECOLOGY
Payer: MEDICAID

## 2019-06-05 VITALS — SYSTOLIC BLOOD PRESSURE: 114 MMHG | HEART RATE: 86 BPM | DIASTOLIC BLOOD PRESSURE: 72 MMHG

## 2019-06-05 DIAGNOSIS — O26.899 ABDOMINAL PAIN IN PREGNANCY: ICD-10-CM

## 2019-06-05 DIAGNOSIS — R10.9 ABDOMINAL PAIN IN PREGNANCY: ICD-10-CM

## 2019-06-05 LAB — BACTERIA UR CULT: NORMAL

## 2019-06-05 PROCEDURE — 99211 OFF/OP EST MAY X REQ PHY/QHP: CPT | Mod: 25

## 2019-06-05 PROCEDURE — 96360 HYDRATION IV INFUSION INIT: CPT

## 2019-06-05 NOTE — PROGRESS NOTES
To room via w/c, states she has had ctxs since about noon, states they were back to back, states she had been taking her abx for her bladder infection.... Crying, states she had intercourse last night. Oriented to obs plan of care, call light in reach, placed on ef,

## 2019-06-05 NOTE — PROGRESS NOTES
Iv discontinued, pressure dressing applied, plan of care discussed,discharge instructions given... Patient verb understanding.

## 2019-06-05 NOTE — PROGRESS NOTES
To room efm adjusted, talked to patient again about [place],informed of speaking with Dr. Perez, and new plan of care. sve done as charted.

## 2019-06-05 NOTE — PROGRESS NOTES
To room, patient states she wants to go home, states she feels much better and actually feels no more contractions... Plan of care discussed at length, need to see her own md or transfer care to an md on the [facility] where she now lives.

## 2019-06-05 NOTE — DISCHARGE INSTRUCTIONS
Home Undelivered Discharge Instructions    After Discharge Orders:    No future appointments.    Call physician for any problems    Current Discharge Medication List      CONTINUE these medications which have NOT CHANGED    Details   !! acetaminophen (TYLENOL) 325 MG tablet Take 2 tablets (650 mg total) by mouth every 6 (six) hours as needed for Pain.  Qty: 15 tablet, Refills: 0      !! acetaminophen (TYLENOL) 325 MG tablet Take 1 tablet (325 mg total) by mouth every 6 (six) hours as needed for Pain.  Refills: 0      atazanavir (REYATAZ) 300 MG Cap Take 300 mg by mouth daily with breakfast.      lcxmgpv-bvnf-syoarh-tenofo ala [phone] mg Tab Take 1 tablet by mouth once daily.  Qty: 30 tablet, Refills: 1    Associated Diagnoses: HIV (human immunodeficiency virus infection)      emtricitabine-tenofovir 200-300 mg (TRUVADA) 200-300 mg Tab Take 1 tablet by mouth once daily.      prenatal 21-iron fu-folic acid (PRENATAL COMPLETE) 14 mg iron- 400 mcg Tab Take 1 tablet by mouth once daily.  Qty: 30 tablet, Refills: 9      ritonavir (NORVIR) 100 mg Cap Take 100 mg by mouth 2 (two) times daily.      traMADol (ULTRAM) 50 mg tablet Take 50 mg by mouth every 6 (six) hours.       !! - Potential duplicate medications found. Please discuss with provider.                  · Diet:  normal diet as tolerated    · Rest: normal activity as tolerated    Other instructions: Do kick counts once a day on your baby. Choose the time of day your baby is most active. Get in a comfortable lying or sitting position and time how long it takes to feel 10 kicks, twists, turns, swishes, or rolls. Call your physician or midwife if there have not been 10 kicks in 1 hours    Call physician or midwife, return to Labor and Delivery, call 911, or go to the nearest Emergency Room if: increased leakage or fluid, contractions more than  5 per  1 hour, decreased fetal movement, persistent low back pain or cramping, bleeding from vaginal area, difficulty  urinating, pain with urination, difficulty breathing or new calf pain

## 2019-06-05 NOTE — PROGRESS NOTES
To room again, no visitors room, explained to patient need to have a scheduled c/section with her md at [facility] as she would require medication prior to her babies birth.

## 2019-08-10 ENCOUNTER — HOSPITAL ENCOUNTER (OUTPATIENT)
Facility: HOSPITAL | Age: 24
Discharge: HOME | End: 2019-08-10
Attending: OBSTETRICS & GYNECOLOGY | Admitting: OBSTETRICS & GYNECOLOGY
Payer: MEDICAID

## 2019-08-10 DIAGNOSIS — R10.9 ABDOMINAL PAIN: ICD-10-CM

## 2019-08-10 LAB
BILIRUB UR QL STRIP: NEGATIVE
CLARITY UR: CLEAR
COLOR UR: YELLOW
GLUCOSE UR QL STRIP: NEGATIVE
HGB UR QL STRIP: NEGATIVE
KETONES UR QL STRIP: NEGATIVE
LEUKOCYTE ESTERASE UR QL STRIP: ABNORMAL
MICROSCOPIC COMMENT: NORMAL
NITRITE UR QL STRIP: NEGATIVE
PH UR STRIP: 7 [PH] (ref 5–8)
PROT UR QL STRIP: NEGATIVE
RBC #/AREA URNS HPF: 1 /HPF (ref 0–4)
SP GR UR STRIP: 1.02 (ref 1–1.03)
URN SPEC COLLECT METH UR: ABNORMAL
UROBILINOGEN UR STRIP-ACNC: ABNORMAL EU/DL
WBC #/AREA URNS HPF: 3 /HPF (ref 0–5)

## 2019-08-10 PROCEDURE — 99211 OFF/OP EST MAY X REQ PHY/QHP: CPT

## 2019-08-10 PROCEDURE — 81000 URINALYSIS NONAUTO W/SCOPE: CPT

## 2019-08-11 VITALS
OXYGEN SATURATION: 100 % | DIASTOLIC BLOOD PRESSURE: 59 MMHG | TEMPERATURE: 99 F | SYSTOLIC BLOOD PRESSURE: 115 MMHG | HEART RATE: 83 BPM

## 2019-08-11 NOTE — NURSING
RN called Dr. Valdivia to give update on patient status. Patient's cervix remains unchanged from previous check. 1/thick/high. Occassional contractions that correlate to when patient feels abdominal pain. Urine analysis unremarkable. Orders given to discharge patient.

## 2019-08-11 NOTE — DISCHARGE INSTRUCTIONS
Home Undelivered Discharge Instructions    After Discharge Orders:    No future appointments.        Current Discharge Medication List      CONTINUE these medications which have NOT CHANGED    Details   !! acetaminophen (TYLENOL) 325 MG tablet Take 2 tablets (650 mg total) by mouth every 6 (six) hours as needed for Pain.  Qty: 15 tablet, Refills: 0      !! acetaminophen (TYLENOL) 325 MG tablet Take 1 tablet (325 mg total) by mouth every 6 (six) hours as needed for Pain.  Refills: 0      atazanavir (REYATAZ) 300 MG Cap Take 300 mg by mouth daily with breakfast.      dvsllws-nhss-vvefhy-tenofo ala [phone] mg Tab Take 1 tablet by mouth once daily.  Qty: 30 tablet, Refills: 1    Associated Diagnoses: HIV (human immunodeficiency virus infection)      emtricitabine-tenofovir 200-300 mg (TRUVADA) 200-300 mg Tab Take 1 tablet by mouth once daily.      prenatal 21-iron fu-folic acid (PRENATAL COMPLETE) 14 mg iron- 400 mcg Tab Take 1 tablet by mouth once daily.  Qty: 30 tablet, Refills: 9      ritonavir (NORVIR) 100 mg Cap Take 100 mg by mouth 2 (two) times daily.      traMADol (ULTRAM) 50 mg tablet Take 50 mg by mouth every 6 (six) hours.       !! - Potential duplicate medications found. Please discuss with provider.                  · Diet:  normal diet as tolerated    · Rest: normal activity as tolerated    Other instructions: Do kick counts once a day on your baby. Choose the time of day your baby is most active. Get in a comfortable lying or sitting position and time how long it takes to feel 10 kicks, twists, turns, swishes, or rolls. Call your physician or midwife if there have not been 10 kicks in 2 hours    Call physician or midwife, return to Labor and Delivery, call 911, or go to the nearest Emergency Room if: increased leakage or fluid, decreased fetal movement, persistent low back pain or cramping, bleeding from vaginal area, difficulty urinating, pain with urination, difficulty breathing, new calf pain,  persistent headache or vision change

## 2019-08-11 NOTE — NURSING
Patient presents to triage with c/o abdominal pain on left side. Denies bleeding, loss of fluid. Positive fetal movement. SVE 1/thick/high Fetal heart rate tracing reactive. Ocassional contraction. PO hydration given. Will continue to monitor.

## 2019-08-11 NOTE — NURSING
Discharge instructions given to patient. Labor precautions discussed. Preeclampsia symptoms discussed. Encouraged patient to transfer care to our doctors if planning to deliver here per Dr. Valdivia. List of doctors given to patient.

## 2021-04-16 ENCOUNTER — PATIENT MESSAGE (OUTPATIENT)
Dept: RESEARCH | Facility: HOSPITAL | Age: 26
End: 2021-04-16

## 2021-06-26 ENCOUNTER — HOSPITAL ENCOUNTER (EMERGENCY)
Facility: HOSPITAL | Age: 26
Discharge: HOME | End: 2021-06-26
Attending: EMERGENCY MEDICINE
Payer: MEDICAID

## 2021-06-26 VITALS
DIASTOLIC BLOOD PRESSURE: 56 MMHG | TEMPERATURE: 99 F | OXYGEN SATURATION: 98 % | HEART RATE: 86 BPM | SYSTOLIC BLOOD PRESSURE: 110 MMHG | HEIGHT: 66 IN | BODY MASS INDEX: 24.91 KG/M2 | RESPIRATION RATE: 18 BRPM | WEIGHT: 155 LBS

## 2021-06-26 DIAGNOSIS — N30.90 CYSTITIS: ICD-10-CM

## 2021-06-26 DIAGNOSIS — R05.9 COUGH: ICD-10-CM

## 2021-06-26 DIAGNOSIS — B34.9 VIRAL ILLNESS: Primary | ICD-10-CM

## 2021-06-26 LAB
ALBUMIN SERPL BCP-MCNC: 3.6 G/DL (ref 3.5–5.2)
ALP SERPL-CCNC: 63 U/L (ref 55–135)
ALT SERPL W/O P-5'-P-CCNC: 11 U/L (ref 10–44)
ANION GAP SERPL CALC-SCNC: 10 MMOL/L (ref 8–16)
AST SERPL-CCNC: 16 U/L (ref 10–40)
B-HCG UR QL: NEGATIVE
BASOPHILS # BLD AUTO: 0.05 K/UL (ref 0–0.2)
BASOPHILS NFR BLD: 0.5 % (ref 0–1.9)
BILIRUB SERPL-MCNC: 0.3 MG/DL (ref 0.1–1)
BILIRUB UR QL STRIP: NEGATIVE
BUN SERPL-MCNC: 5 MG/DL (ref 6–20)
CALCIUM SERPL-MCNC: 8.7 MG/DL (ref 8.7–10.5)
CHLORIDE SERPL-SCNC: 104 MMOL/L (ref 95–110)
CLARITY UR: CLEAR
CO2 SERPL-SCNC: 22 MMOL/L (ref 23–29)
COLOR UR: YELLOW
CREAT SERPL-MCNC: 1 MG/DL (ref 0.5–1.4)
CTP QC/QA: YES
DIFFERENTIAL METHOD: ABNORMAL
EOSINOPHIL # BLD AUTO: 0 K/UL (ref 0–0.5)
EOSINOPHIL NFR BLD: 0.3 % (ref 0–8)
ERYTHROCYTE [DISTWIDTH] IN BLOOD BY AUTOMATED COUNT: 17.1 % (ref 11.5–14.5)
EST. GFR  (AFRICAN AMERICAN): >60 ML/MIN/1.73 M^2
EST. GFR  (NON AFRICAN AMERICAN): >60 ML/MIN/1.73 M^2
GLUCOSE SERPL-MCNC: 108 MG/DL (ref 70–110)
GLUCOSE UR QL STRIP: NEGATIVE
HCT VFR BLD AUTO: 27.9 % (ref 37–48.5)
HGB BLD-MCNC: 8.3 G/DL (ref 12–16)
HGB UR QL STRIP: ABNORMAL
IMM GRANULOCYTES # BLD AUTO: 0.03 K/UL (ref 0–0.04)
IMM GRANULOCYTES NFR BLD AUTO: 0.3 % (ref 0–0.5)
KETONES UR QL STRIP: NEGATIVE
LEUKOCYTE ESTERASE UR QL STRIP: ABNORMAL
LYMPHOCYTES # BLD AUTO: 1.6 K/UL (ref 1–4.8)
LYMPHOCYTES NFR BLD: 15.7 % (ref 18–48)
MCH RBC QN AUTO: 20.3 PG (ref 27–31)
MCHC RBC AUTO-ENTMCNC: 29.7 G/DL (ref 32–36)
MCV RBC AUTO: 68 FL (ref 82–98)
MICROSCOPIC COMMENT: ABNORMAL
MONOCYTES # BLD AUTO: 1.3 K/UL (ref 0.3–1)
MONOCYTES NFR BLD: 12.6 % (ref 4–15)
NEUTROPHILS # BLD AUTO: 7.2 K/UL (ref 1.8–7.7)
NEUTROPHILS NFR BLD: 70.6 % (ref 38–73)
NITRITE UR QL STRIP: NEGATIVE
NRBC BLD-RTO: 0 /100 WBC
PH UR STRIP: 7 [PH] (ref 5–8)
PLATELET # BLD AUTO: ABNORMAL K/UL (ref 150–450)
PMV BLD AUTO: ABNORMAL FL (ref 9.2–12.9)
POC MOLECULAR INFLUENZA A AGN: NEGATIVE
POC MOLECULAR INFLUENZA B AGN: NEGATIVE
POTASSIUM SERPL-SCNC: 3.4 MMOL/L (ref 3.5–5.1)
PROT SERPL-MCNC: 8.1 G/DL (ref 6–8.4)
PROT UR QL STRIP: ABNORMAL
RBC # BLD AUTO: 4.08 M/UL (ref 4–5.4)
RBC #/AREA URNS HPF: 2 /HPF (ref 0–4)
SARS-COV-2 RDRP RESP QL NAA+PROBE: NEGATIVE
SODIUM SERPL-SCNC: 136 MMOL/L (ref 136–145)
SP GR UR STRIP: 1.01 (ref 1–1.03)
SQUAMOUS #/AREA URNS HPF: 0 /HPF
URN SPEC COLLECT METH UR: ABNORMAL
UROBILINOGEN UR STRIP-ACNC: NEGATIVE EU/DL
WBC # BLD AUTO: 10.16 K/UL (ref 3.9–12.7)
WBC #/AREA URNS HPF: 20 /HPF (ref 0–5)

## 2021-06-26 PROCEDURE — 85025 COMPLETE CBC W/AUTO DIFF WBC: CPT | Performed by: PHYSICIAN ASSISTANT

## 2021-06-26 PROCEDURE — 80053 COMPREHEN METABOLIC PANEL: CPT | Performed by: PHYSICIAN ASSISTANT

## 2021-06-26 PROCEDURE — 99284 EMERGENCY DEPT VISIT MOD MDM: CPT | Mod: 25

## 2021-06-26 PROCEDURE — 87088 URINE BACTERIA CULTURE: CPT | Performed by: PHYSICIAN ASSISTANT

## 2021-06-26 PROCEDURE — 87086 URINE CULTURE/COLONY COUNT: CPT | Performed by: PHYSICIAN ASSISTANT

## 2021-06-26 PROCEDURE — 96360 HYDRATION IV INFUSION INIT: CPT

## 2021-06-26 PROCEDURE — 87186 SC STD MICRODIL/AGAR DIL: CPT | Performed by: PHYSICIAN ASSISTANT

## 2021-06-26 PROCEDURE — U0002 COVID-19 LAB TEST NON-CDC: HCPCS | Performed by: PHYSICIAN ASSISTANT

## 2021-06-26 PROCEDURE — 63600175 PHARM REV CODE 636 W HCPCS: Performed by: PHYSICIAN ASSISTANT

## 2021-06-26 PROCEDURE — 81025 URINE PREGNANCY TEST: CPT | Performed by: PHYSICIAN ASSISTANT

## 2021-06-26 PROCEDURE — 96372 THER/PROPH/DIAG INJ SC/IM: CPT

## 2021-06-26 PROCEDURE — 87077 CULTURE AEROBIC IDENTIFY: CPT | Performed by: PHYSICIAN ASSISTANT

## 2021-06-26 PROCEDURE — 81000 URINALYSIS NONAUTO W/SCOPE: CPT | Performed by: PHYSICIAN ASSISTANT

## 2021-06-26 PROCEDURE — 87502 INFLUENZA DNA AMP PROBE: CPT

## 2021-06-26 PROCEDURE — 25000003 PHARM REV CODE 250: Performed by: PHYSICIAN ASSISTANT

## 2021-06-26 RX ORDER — CEPHALEXIN 500 MG/1
500 CAPSULE ORAL
Status: DISCONTINUED | OUTPATIENT
Start: 2021-06-26 | End: 2021-06-26

## 2021-06-26 RX ORDER — ONDANSETRON 4 MG/1
4 TABLET, ORALLY DISINTEGRATING ORAL EVERY 6 HOURS PRN
Qty: 20 TABLET | Refills: 0 | Status: SHIPPED | OUTPATIENT
Start: 2021-06-26 | End: 2021-10-26

## 2021-06-26 RX ORDER — ACETAMINOPHEN 500 MG
1000 TABLET ORAL
Status: COMPLETED | OUTPATIENT
Start: 2021-06-26 | End: 2021-06-26

## 2021-06-26 RX ORDER — GUAIFENESIN/DEXTROMETHORPHAN 100-10MG/5
10 SYRUP ORAL 4 TIMES DAILY PRN
Qty: 120 ML | Refills: 0 | Status: SHIPPED | OUTPATIENT
Start: 2021-06-26 | End: 2021-07-06

## 2021-06-26 RX ORDER — CETIRIZINE HYDROCHLORIDE 10 MG/1
10 TABLET ORAL DAILY
Qty: 10 TABLET | Refills: 0 | Status: SHIPPED | OUTPATIENT
Start: 2021-06-26 | End: 2021-07-06

## 2021-06-26 RX ORDER — CEFTRIAXONE 1 G/1
1 INJECTION, POWDER, FOR SOLUTION INTRAMUSCULAR; INTRAVENOUS
Status: COMPLETED | OUTPATIENT
Start: 2021-06-26 | End: 2021-06-26

## 2021-06-26 RX ORDER — CIPROFLOXACIN 500 MG/1
500 TABLET ORAL 2 TIMES DAILY
Qty: 14 TABLET | Refills: 0 | Status: SHIPPED | OUTPATIENT
Start: 2021-06-26 | End: 2021-07-03

## 2021-06-26 RX ORDER — IBUPROFEN 600 MG/1
600 TABLET ORAL
Status: COMPLETED | OUTPATIENT
Start: 2021-06-26 | End: 2021-06-26

## 2021-06-26 RX ORDER — FLUTICASONE PROPIONATE 50 MCG
2 SPRAY, SUSPENSION (ML) NASAL DAILY PRN
Qty: 15 G | Refills: 0 | Status: SHIPPED | OUTPATIENT
Start: 2021-06-26 | End: 2021-10-26

## 2021-06-26 RX ORDER — DEXTROMETHORPHAN HYDROBROMIDE, GUAIFENESIN 5; 100 MG/5ML; MG/5ML
650 LIQUID ORAL 3 TIMES DAILY PRN
Qty: 20 TABLET | Refills: 0 | Status: SHIPPED | OUTPATIENT
Start: 2021-06-26 | End: 2021-10-26

## 2021-06-26 RX ORDER — LIDOCAINE HYDROCHLORIDE 10 MG/ML
5 INJECTION INFILTRATION; PERINEURAL
Status: COMPLETED | OUTPATIENT
Start: 2021-06-26 | End: 2021-06-26

## 2021-06-26 RX ADMIN — LIDOCAINE HYDROCHLORIDE 5 ML: 10 INJECTION, SOLUTION INFILTRATION; PERINEURAL at 09:06

## 2021-06-26 RX ADMIN — CEFTRIAXONE 1 G: 1 INJECTION, POWDER, FOR SOLUTION INTRAMUSCULAR; INTRAVENOUS at 09:06

## 2021-06-26 RX ADMIN — ACETAMINOPHEN 1000 MG: 500 TABLET, FILM COATED ORAL at 08:06

## 2021-06-26 RX ADMIN — SODIUM CHLORIDE 1500 ML: 0.9 INJECTION, SOLUTION INTRAVENOUS at 10:06

## 2021-06-26 RX ADMIN — IBUPROFEN 600 MG: 600 TABLET ORAL at 08:06

## 2021-06-28 LAB — BACTERIA UR CULT: ABNORMAL

## 2021-10-25 PROCEDURE — 99283 EMERGENCY DEPT VISIT LOW MDM: CPT

## 2021-10-25 RX ORDER — ACETAMINOPHEN 500 MG
1000 TABLET ORAL
Status: COMPLETED | OUTPATIENT
Start: 2021-10-25 | End: 2021-10-26

## 2021-10-26 ENCOUNTER — HOSPITAL ENCOUNTER (EMERGENCY)
Facility: HOSPITAL | Age: 26
Discharge: HOME | End: 2021-10-26
Attending: EMERGENCY MEDICINE
Payer: MEDICAID

## 2021-10-26 VITALS
RESPIRATION RATE: 18 BRPM | HEIGHT: 66 IN | BODY MASS INDEX: 22.5 KG/M2 | HEART RATE: 72 BPM | DIASTOLIC BLOOD PRESSURE: 68 MMHG | TEMPERATURE: 98 F | SYSTOLIC BLOOD PRESSURE: 125 MMHG | OXYGEN SATURATION: 100 % | WEIGHT: 140 LBS

## 2021-10-26 DIAGNOSIS — T14.90XA TRAUMA: ICD-10-CM

## 2021-10-26 DIAGNOSIS — M79.672 LEFT FOOT PAIN: Primary | ICD-10-CM

## 2021-10-26 PROCEDURE — 25000003 PHARM REV CODE 250: Performed by: NURSE PRACTITIONER

## 2021-10-26 RX ADMIN — ACETAMINOPHEN 1000 MG: 500 TABLET ORAL at 01:10

## 2021-11-27 ENCOUNTER — HOSPITAL ENCOUNTER (EMERGENCY)
Facility: OTHER | Age: 26
Discharge: HOME | End: 2021-11-27
Attending: EMERGENCY MEDICINE
Payer: MEDICAID

## 2021-11-27 VITALS
HEART RATE: 77 BPM | BODY MASS INDEX: 23.4 KG/M2 | RESPIRATION RATE: 16 BRPM | DIASTOLIC BLOOD PRESSURE: 57 MMHG | SYSTOLIC BLOOD PRESSURE: 123 MMHG | TEMPERATURE: 98 F | OXYGEN SATURATION: 100 % | WEIGHT: 145 LBS

## 2021-11-27 DIAGNOSIS — B34.9 VIRAL SYNDROME: ICD-10-CM

## 2021-11-27 DIAGNOSIS — D50.9 MICROCYTIC ANEMIA: Primary | ICD-10-CM

## 2021-11-27 DIAGNOSIS — R53.83 FATIGUE, UNSPECIFIED TYPE: ICD-10-CM

## 2021-11-27 DIAGNOSIS — Z23 ENCOUNTER FOR VACCINATION: ICD-10-CM

## 2021-11-27 LAB
ALBUMIN SERPL BCP-MCNC: 4 G/DL (ref 3.5–5.2)
ALP SERPL-CCNC: 57 U/L (ref 55–135)
ALT SERPL W/O P-5'-P-CCNC: 15 U/L (ref 10–44)
ANION GAP SERPL CALC-SCNC: 14 MMOL/L (ref 8–16)
AST SERPL-CCNC: 23 U/L (ref 10–40)
B-HCG UR QL: NEGATIVE
BASOPHILS # BLD AUTO: 0.04 K/UL (ref 0–0.2)
BASOPHILS NFR BLD: 1 % (ref 0–1.9)
BILIRUB SERPL-MCNC: 0.6 MG/DL (ref 0.1–1)
BUN SERPL-MCNC: 10 MG/DL (ref 6–20)
CALCIUM SERPL-MCNC: 9.4 MG/DL (ref 8.7–10.5)
CHLORIDE SERPL-SCNC: 105 MMOL/L (ref 95–110)
CO2 SERPL-SCNC: 19 MMOL/L (ref 23–29)
CREAT SERPL-MCNC: 0.9 MG/DL (ref 0.5–1.4)
CTP QC/QA: YES
DIFFERENTIAL METHOD: ABNORMAL
EOSINOPHIL # BLD AUTO: 0 K/UL (ref 0–0.5)
EOSINOPHIL NFR BLD: 0.7 % (ref 0–8)
ERYTHROCYTE [DISTWIDTH] IN BLOOD BY AUTOMATED COUNT: 17.4 % (ref 11.5–14.5)
EST. GFR  (AFRICAN AMERICAN): >60 ML/MIN/1.73 M^2
EST. GFR  (NON AFRICAN AMERICAN): >60 ML/MIN/1.73 M^2
GLUCOSE SERPL-MCNC: 93 MG/DL (ref 70–110)
HCT VFR BLD AUTO: 31.1 % (ref 37–48.5)
HCV AB SERPL QL IA: NEGATIVE
HGB BLD-MCNC: 8.8 G/DL (ref 12–16)
IMM GRANULOCYTES # BLD AUTO: 0 K/UL (ref 0–0.04)
IMM GRANULOCYTES NFR BLD AUTO: 0 % (ref 0–0.5)
LYMPHOCYTES # BLD AUTO: 1.9 K/UL (ref 1–4.8)
LYMPHOCYTES NFR BLD: 45.1 % (ref 18–48)
MCH RBC QN AUTO: 20.2 PG (ref 27–31)
MCHC RBC AUTO-ENTMCNC: 28.3 G/DL (ref 32–36)
MCV RBC AUTO: 72 FL (ref 82–98)
MONOCYTES # BLD AUTO: 0.5 K/UL (ref 0.3–1)
MONOCYTES NFR BLD: 12.1 % (ref 4–15)
NEUTROPHILS # BLD AUTO: 1.7 K/UL (ref 1.8–7.7)
NEUTROPHILS NFR BLD: 41.1 % (ref 38–73)
NRBC BLD-RTO: 0 /100 WBC
PLATELET # BLD AUTO: 436 K/UL (ref 150–450)
PMV BLD AUTO: 9 FL (ref 9.2–12.9)
POC MOLECULAR INFLUENZA A AGN: NEGATIVE
POC MOLECULAR INFLUENZA B AGN: NEGATIVE
POTASSIUM SERPL-SCNC: 4.1 MMOL/L (ref 3.5–5.1)
PROT SERPL-MCNC: 8.2 G/DL (ref 6–8.4)
RBC # BLD AUTO: 4.35 M/UL (ref 4–5.4)
SARS-COV-2 RDRP RESP QL NAA+PROBE: NEGATIVE
SODIUM SERPL-SCNC: 138 MMOL/L (ref 136–145)
WBC # BLD AUTO: 4.12 K/UL (ref 3.9–12.7)

## 2021-11-27 PROCEDURE — 63600175 PHARM REV CODE 636 W HCPCS: Performed by: NURSE PRACTITIONER

## 2021-11-27 PROCEDURE — 91300 PHARM REV CODE 636 W HCPCS: CPT | Performed by: NURSE PRACTITIONER

## 2021-11-27 PROCEDURE — 85025 COMPLETE CBC W/AUTO DIFF WBC: CPT | Performed by: NURSE PRACTITIONER

## 2021-11-27 PROCEDURE — 96361 HYDRATE IV INFUSION ADD-ON: CPT

## 2021-11-27 PROCEDURE — 86803 HEPATITIS C AB TEST: CPT | Performed by: EMERGENCY MEDICINE

## 2021-11-27 PROCEDURE — 81025 URINE PREGNANCY TEST: CPT | Performed by: EMERGENCY MEDICINE

## 2021-11-27 PROCEDURE — 80053 COMPREHEN METABOLIC PANEL: CPT | Performed by: NURSE PRACTITIONER

## 2021-11-27 PROCEDURE — 0002A HC IMMUNIZ ADMIN, SARS-COV-2 COVID-19 VACC, 30MCG/0.3ML, 2ND DOSE: CPT | Performed by: NURSE PRACTITIONER

## 2021-11-27 PROCEDURE — U0002 COVID-19 LAB TEST NON-CDC: HCPCS | Performed by: NURSE PRACTITIONER

## 2021-11-27 PROCEDURE — 99284 EMERGENCY DEPT VISIT MOD MDM: CPT | Mod: 25

## 2021-11-27 PROCEDURE — 96360 HYDRATION IV INFUSION INIT: CPT

## 2021-11-27 RX ORDER — BICTEGRAVIR SODIUM, EMTRICITABINE, AND TENOFOVIR ALAFENAMIDE FUMARATE 50; 200; 25 MG/1; MG/1; MG/1
1 TABLET ORAL DAILY
COMMUNITY

## 2021-11-27 RX ADMIN — SODIUM CHLORIDE, SODIUM LACTATE, POTASSIUM CHLORIDE, AND CALCIUM CHLORIDE 1000 ML: .6; .31; .03; .02 INJECTION, SOLUTION INTRAVENOUS at 01:11

## 2021-11-27 RX ADMIN — RNA INGREDIENT BNT-162B2 0.3 ML: 0.23 INJECTION, SUSPENSION INTRAMUSCULAR at 04:11

## 2022-04-12 ENCOUNTER — HOSPITAL ENCOUNTER (EMERGENCY)
Facility: OTHER | Age: 27
Discharge: HOME | End: 2022-04-12
Attending: EMERGENCY MEDICINE
Payer: COMMERCIAL

## 2022-04-12 VITALS
TEMPERATURE: 98 F | HEIGHT: 66 IN | BODY MASS INDEX: 27.32 KG/M2 | RESPIRATION RATE: 17 BRPM | HEART RATE: 78 BPM | SYSTOLIC BLOOD PRESSURE: 117 MMHG | DIASTOLIC BLOOD PRESSURE: 69 MMHG | OXYGEN SATURATION: 100 % | WEIGHT: 170 LBS

## 2022-04-12 DIAGNOSIS — H57.89 IRRITATION OF RIGHT EYE: Primary | ICD-10-CM

## 2022-04-12 LAB
B-HCG UR QL: NEGATIVE
CTP QC/QA: YES

## 2022-04-12 PROCEDURE — 25000003 PHARM REV CODE 250: Performed by: EMERGENCY MEDICINE

## 2022-04-12 PROCEDURE — 99283 EMERGENCY DEPT VISIT LOW MDM: CPT | Mod: 25

## 2022-04-12 PROCEDURE — 81025 URINE PREGNANCY TEST: CPT | Performed by: EMERGENCY MEDICINE

## 2022-04-12 RX ORDER — PROPARACAINE HYDROCHLORIDE 5 MG/ML
1 SOLUTION/ DROPS OPHTHALMIC
Status: COMPLETED | OUTPATIENT
Start: 2022-04-12 | End: 2022-04-12

## 2022-04-12 RX ORDER — ERYTHROMYCIN 5 MG/G
OINTMENT OPHTHALMIC EVERY 4 HOURS
Qty: 3.5 G | Refills: 0 | Status: SHIPPED | OUTPATIENT
Start: 2022-04-12

## 2022-04-12 RX ADMIN — PROPARACAINE HYDROCHLORIDE 1 DROP: 5 SOLUTION/ DROPS OPHTHALMIC at 06:04

## 2022-04-12 RX ADMIN — FLUORESCEIN SODIUM 1 EACH: 1 STRIP OPHTHALMIC at 06:04

## 2022-04-12 NOTE — ED NOTES
Pt awake and alert; resting quietly on stretcher with lights off. Pt remains on continuous pulse ox monitoring with non-invasive blood pressure to cycle every 30 minutes. VS stable. Pt reporting R eye pain and irritation at this time; pt intermittently rubbing R eye due to discomfort. Pt denies restroom needs at this time; is able to reposition self on stretcher. Bed locked in lowest position; side rails up and locked x 2; call light, bedside table, and personal belongings within reach. Room assessed for safety measures and cleanliness; no action needed at this time. Plan of care discussed. Pt instructed to alert nurse for assistance and before attempting to get out of bed; verbalizes understanding. Pt denies needs or complaints at this time; will continue to monitor.
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
## 2022-04-13 NOTE — DISCHARGE INSTRUCTIONS
Please return to the ER if you have chest pain, difficulty breathing, fevers, altered mental status, dizziness, weakness, or any other concerns.      We have provided you with an eye doctor to follow up with.  Call to make an appointment and let them know you were seen in the Emergency Department and provided with a referral.

## 2022-10-21 ENCOUNTER — HOSPITAL ENCOUNTER (EMERGENCY)
Facility: HOSPITAL | Age: 27
Discharge: HOME | End: 2022-10-21
Attending: EMERGENCY MEDICINE
Payer: MEDICAID

## 2022-10-21 VITALS
TEMPERATURE: 99 F | BODY MASS INDEX: 24.11 KG/M2 | WEIGHT: 150 LBS | HEIGHT: 66 IN | RESPIRATION RATE: 18 BRPM | DIASTOLIC BLOOD PRESSURE: 73 MMHG | HEART RATE: 80 BPM | OXYGEN SATURATION: 100 % | SYSTOLIC BLOOD PRESSURE: 129 MMHG

## 2022-10-21 DIAGNOSIS — B34.9 VIRAL SYNDROME: Primary | ICD-10-CM

## 2022-10-21 DIAGNOSIS — R11.2 NAUSEA AND VOMITING, UNSPECIFIED VOMITING TYPE: ICD-10-CM

## 2022-10-21 LAB
ALBUMIN SERPL BCP-MCNC: 4 G/DL (ref 3.5–5.2)
ALP SERPL-CCNC: 54 U/L (ref 55–135)
ALT SERPL W/O P-5'-P-CCNC: 9 U/L (ref 10–44)
ANION GAP SERPL CALC-SCNC: 9 MMOL/L (ref 8–16)
AST SERPL-CCNC: 21 U/L (ref 10–40)
B-HCG UR QL: NEGATIVE
BASOPHILS # BLD AUTO: 0.04 K/UL (ref 0–0.2)
BASOPHILS NFR BLD: 0.8 % (ref 0–1.9)
BILIRUB SERPL-MCNC: 0.3 MG/DL (ref 0.1–1)
BILIRUB UR QL STRIP: NEGATIVE
BUN SERPL-MCNC: 6 MG/DL (ref 6–20)
CALCIUM SERPL-MCNC: 9.4 MG/DL (ref 8.7–10.5)
CHLORIDE SERPL-SCNC: 106 MMOL/L (ref 95–110)
CLARITY UR: CLEAR
CO2 SERPL-SCNC: 26 MMOL/L (ref 23–29)
COLOR UR: YELLOW
CREAT SERPL-MCNC: 0.9 MG/DL (ref 0.5–1.4)
CTP QC/QA: YES
CTP QC/QA: YES
DIFFERENTIAL METHOD: ABNORMAL
EOSINOPHIL # BLD AUTO: 0 K/UL (ref 0–0.5)
EOSINOPHIL NFR BLD: 0.6 % (ref 0–8)
ERYTHROCYTE [DISTWIDTH] IN BLOOD BY AUTOMATED COUNT: 16.9 % (ref 11.5–14.5)
EST. GFR  (NO RACE VARIABLE): >60 ML/MIN/1.73 M^2
GLUCOSE SERPL-MCNC: 91 MG/DL (ref 70–110)
GLUCOSE UR QL STRIP: NEGATIVE
HCT VFR BLD AUTO: 30.7 % (ref 37–48.5)
HGB BLD-MCNC: 9 G/DL (ref 12–16)
HGB UR QL STRIP: NEGATIVE
IMM GRANULOCYTES # BLD AUTO: 0 K/UL (ref 0–0.04)
IMM GRANULOCYTES NFR BLD AUTO: 0 % (ref 0–0.5)
KETONES UR QL STRIP: NEGATIVE
LEUKOCYTE ESTERASE UR QL STRIP: NEGATIVE
LIPASE SERPL-CCNC: 28 U/L (ref 4–60)
LYMPHOCYTES # BLD AUTO: 2.8 K/UL (ref 1–4.8)
LYMPHOCYTES NFR BLD: 51.7 % (ref 18–48)
MCH RBC QN AUTO: 22.1 PG (ref 27–31)
MCHC RBC AUTO-ENTMCNC: 29.3 G/DL (ref 32–36)
MCV RBC AUTO: 75 FL (ref 82–98)
MONOCYTES # BLD AUTO: 0.4 K/UL (ref 0.3–1)
MONOCYTES NFR BLD: 8.1 % (ref 4–15)
NEUTROPHILS # BLD AUTO: 2.1 K/UL (ref 1.8–7.7)
NEUTROPHILS NFR BLD: 38.8 % (ref 38–73)
NITRITE UR QL STRIP: NEGATIVE
NRBC BLD-RTO: 0 /100 WBC
PH UR STRIP: 7 [PH] (ref 5–8)
PLATELET # BLD AUTO: 495 K/UL (ref 150–450)
PMV BLD AUTO: 8.7 FL (ref 9.2–12.9)
POC MOLECULAR INFLUENZA A AGN: NEGATIVE
POC MOLECULAR INFLUENZA B AGN: NEGATIVE
POTASSIUM SERPL-SCNC: 3.7 MMOL/L (ref 3.5–5.1)
PROT SERPL-MCNC: 8 G/DL (ref 6–8.4)
PROT UR QL STRIP: NEGATIVE
RBC # BLD AUTO: 4.07 M/UL (ref 4–5.4)
SODIUM SERPL-SCNC: 141 MMOL/L (ref 136–145)
SP GR UR STRIP: 1.02 (ref 1–1.03)
URN SPEC COLLECT METH UR: ABNORMAL
UROBILINOGEN UR STRIP-ACNC: ABNORMAL EU/DL
WBC # BLD AUTO: 5.32 K/UL (ref 3.9–12.7)

## 2022-10-21 PROCEDURE — 63600175 PHARM REV CODE 636 W HCPCS: Performed by: NURSE PRACTITIONER

## 2022-10-21 PROCEDURE — 99284 EMERGENCY DEPT VISIT MOD MDM: CPT | Mod: 25

## 2022-10-21 PROCEDURE — 81025 URINE PREGNANCY TEST: CPT | Performed by: NURSE PRACTITIONER

## 2022-10-21 PROCEDURE — 25000003 PHARM REV CODE 250: Performed by: NURSE PRACTITIONER

## 2022-10-21 PROCEDURE — 83690 ASSAY OF LIPASE: CPT | Performed by: NURSE PRACTITIONER

## 2022-10-21 PROCEDURE — 81003 URINALYSIS AUTO W/O SCOPE: CPT | Performed by: NURSE PRACTITIONER

## 2022-10-21 PROCEDURE — 85025 COMPLETE CBC W/AUTO DIFF WBC: CPT | Performed by: NURSE PRACTITIONER

## 2022-10-21 PROCEDURE — 80053 COMPREHEN METABOLIC PANEL: CPT | Performed by: NURSE PRACTITIONER

## 2022-10-21 RX ORDER — ONDANSETRON 2 MG/ML
8 INJECTION INTRAMUSCULAR; INTRAVENOUS
Status: COMPLETED | OUTPATIENT
Start: 2022-10-21 | End: 2022-10-21

## 2022-10-21 RX ORDER — ONDANSETRON 4 MG/1
4 TABLET, ORALLY DISINTEGRATING ORAL EVERY 6 HOURS PRN
Qty: 20 TABLET | Refills: 0 | Status: SHIPPED | OUTPATIENT
Start: 2022-10-21

## 2022-10-21 RX ORDER — ACETAMINOPHEN 500 MG
500 TABLET ORAL EVERY 4 HOURS PRN
Qty: 30 TABLET | Refills: 0 | Status: SHIPPED | OUTPATIENT
Start: 2022-10-21

## 2022-10-21 RX ORDER — IBUPROFEN 600 MG/1
600 TABLET ORAL EVERY 6 HOURS PRN
Qty: 20 TABLET | Refills: 0 | Status: SHIPPED | OUTPATIENT
Start: 2022-10-21

## 2022-10-21 RX ADMIN — ONDANSETRON 8 MG: 2 INJECTION INTRAMUSCULAR; INTRAVENOUS at 06:10

## 2022-10-21 RX ADMIN — SODIUM CHLORIDE 1000 ML: 0.9 INJECTION, SOLUTION INTRAVENOUS at 06:10

## 2022-10-21 NOTE — ED TRIAGE NOTES
Patient reports nausea and vomiting x1 day.  Denies diarrhea and/or abdominal pain.  No other concerns noted at this time.

## 2022-10-21 NOTE — ED PROVIDER NOTES
Encounter Date: 10/21/2022    SCRIBE #1 NOTE: I, Lacy Jacob, am scribing for, and in the presence of,  Dustin Valdes NP. I have scribed the following portions of the note - Other sections scribed: TERESITA VIERA.     History     Chief Complaint   Patient presents with    Nausea    Vomiting    Chest Pain     Per pt she has had persistent vomiting on yesterday with continued nausea into today. Reports cp x 1 day. Pt remains nauseous. Reports both son and daughter have had vomiting as well. Denies diarrhea or abd pain     Bonnie Mcdonald is a 27 y.o. female, with a PMHx of HIV, who presents to the ED with nausea onset this morning. Patient reports she had associated vomiting 1 day ago, but it has resolved. Patient notes her children began vomiting today as well. No other exacerbating or alleviating factors. Denies fever, cough, rhinorrhea or other associated symptoms.      The history is provided by the patient.   Review of patient's allergies indicates:  No Known Allergies  Past Medical History:   Diagnosis Date    HIV (human immunodeficiency virus infection)      Past Surgical History:   Procedure Laterality Date     SECTION, LOW TRANSVERSE      miscarriage        Family History   Problem Relation Age of Onset    Breast cancer Neg Hx     Colon cancer Neg Hx     Ovarian cancer Neg Hx      Social History     Tobacco Use    Smoking status: Never    Smokeless tobacco: Never   Substance Use Topics    Alcohol use: No    Drug use: No     Review of Systems   Constitutional:  Negative for fever.   HENT:  Negative for rhinorrhea and sore throat.    Respiratory:  Negative for cough and shortness of breath.    Cardiovascular:  Negative for chest pain.   Gastrointestinal:  Positive for nausea.   Genitourinary:  Negative for dysuria.   Musculoskeletal:  Negative for back pain.   Skin:  Negative for rash.   Neurological:  Negative for weakness.   Hematological:  Does not bruise/bleed easily.     Physical Exam     Initial  Vitals [10/21/22 1745]   BP Pulse Resp Temp SpO2   129/73 80 18 98.8 °F (37.1 °C) 100 %      MAP       --         Physical Exam    Nursing note and vitals reviewed.  Constitutional: She appears well-developed and well-nourished. She is not diaphoretic. No distress.   HENT:   Head: Normocephalic and atraumatic.   Right Ear: External ear normal.   Left Ear: External ear normal.   Nose: Nose normal.   Eyes: Conjunctivae and EOM are normal. Right eye exhibits no discharge. Left eye exhibits no discharge.   Neck: Neck supple. No tracheal deviation present.   Normal range of motion.  Cardiovascular:  Normal rate.           Pulmonary/Chest: No stridor. No respiratory distress.   Abdominal: Abdomen is soft. She exhibits no distension. There is no abdominal tenderness.   No abdominal tenderness to palpation.  Benign exam.   Musculoskeletal:         General: No tenderness. Normal range of motion.      Cervical back: Normal range of motion and neck supple.     Neurological: She is alert and oriented to person, place, and time. She has normal strength. No cranial nerve deficit or sensory deficit.   Skin: Skin is warm and dry.   Psychiatric: She has a normal mood and affect. Her behavior is normal. Judgment and thought content normal.       ED Course   Procedures  Labs Reviewed   CBC W/ AUTO DIFFERENTIAL - Abnormal; Notable for the following components:       Result Value    Hemoglobin 9.0 (*)     Hematocrit 30.7 (*)     MCV 75 (*)     MCH 22.1 (*)     MCHC 29.3 (*)     RDW 16.9 (*)     Platelets 495 (*)     MPV 8.7 (*)     Lymph % 51.7 (*)     All other components within normal limits   COMPREHENSIVE METABOLIC PANEL - Abnormal; Notable for the following components:    Alkaline Phosphatase 54 (*)     ALT 9 (*)     All other components within normal limits   URINALYSIS, REFLEX TO URINE CULTURE - Abnormal; Notable for the following components:    Urobilinogen, UA 4.0-6.0 (*)     All other components within normal limits     Narrative:     Specimen Source->Urine   LIPASE   POCT INFLUENZA A/B MOLECULAR   POCT URINE PREGNANCY   SARS-COV-2 RDRP GENE          Imaging Results    None          Medications   sodium chloride 0.9% bolus 1,000 mL (1,000 mLs Intravenous New Bag 10/21/22 1833)   ondansetron injection 8 mg (8 mg Intravenous Given 10/21/22 1855)     Medical Decision Making:   History:   Old Medical Records: I decided to obtain old medical records.  Clinical Tests:   Lab Tests: Ordered and Reviewed  ED Management:  HPI and physical exam as above.  Labs with hypochromic microcytic anemia but otherwise unremarkable.  Patient is tolerating p.o. without difficulty following treatment in the ED. Patient presents with 1 of her children who is exhibiting similar symptoms.  Likely viral etiology.  Afebrile throughout the ED course.  No clinical signs of dehydration or other emergent pathology.  COVID-19 and flu negative.  No evidence of sepsis.  Will discharge with Zofran.  Advised to follow-up with PCP.  ED return precautions given.  She expressed understanding.        Scribe Attestation:   Scribe #1: I performed the above scribed service and the documentation accurately describes the services I performed. I attest to the accuracy of the note.                   Clinical Impression:   Final diagnoses:  [R11.2] Nausea and vomiting, unspecified vomiting type  [B34.9] Viral syndrome (Primary)      ED Disposition Condition    Discharge Stable        I, Dustin Valdes NP, personally performed the services described in this documentation. All medical record entries made by the scribe were at my direction and in my presence. I have reviewed the chart and agree that the record reflects my personal performance and is accurate and complete.   ED Prescriptions       Medication Sig Dispense Start Date End Date Auth. Provider    ondansetron (ZOFRAN-ODT) 4 MG TbDL Take 1 tablet (4 mg total) by mouth every 6 (six) hours as needed (Nausea). 20 tablet  10/21/2022 -- Dustin Valdes NP    ibuprofen (ADVIL,MOTRIN) 600 MG tablet Take 1 tablet (600 mg total) by mouth every 6 (six) hours as needed for Pain. 20 tablet 10/21/2022 -- Dustin Valdes NP    acetaminophen (TYLENOL) 500 MG tablet Take 1 tablet (500 mg total) by mouth every 4 (four) hours as needed (Fever). 30 tablet 10/21/2022 -- Dustin Valdes NP          Follow-up Information       Follow up With Specialties Details Why Contact Info    [facility]  Schedule an appointment as soon as possible for a visit  For further evaluation [address] [place] [address]  [phone]      [facility] Emergency Dept Emergency Medicine Go to  If symptoms worsen, As needed [address] [facility] [address] [phone]             Dustin Valdes NP  10/22/22 4190

## 2022-10-22 NOTE — DISCHARGE INSTRUCTIONS

## 2023-04-28 ENCOUNTER — HOSPITAL ENCOUNTER (EMERGENCY)
Facility: OTHER | Age: 28
Discharge: HOME | End: 2023-04-28
Attending: EMERGENCY MEDICINE
Payer: MEDICAID

## 2023-04-28 VITALS
DIASTOLIC BLOOD PRESSURE: 73 MMHG | WEIGHT: 160 LBS | HEART RATE: 70 BPM | HEIGHT: 66 IN | RESPIRATION RATE: 18 BRPM | SYSTOLIC BLOOD PRESSURE: 133 MMHG | OXYGEN SATURATION: 100 % | TEMPERATURE: 99 F | BODY MASS INDEX: 25.71 KG/M2

## 2023-04-28 DIAGNOSIS — M54.50 ACUTE RIGHT-SIDED LOW BACK PAIN WITHOUT SCIATICA: Primary | ICD-10-CM

## 2023-04-28 LAB
ALBUMIN SERPL BCP-MCNC: 3.8 G/DL (ref 3.5–5.2)
ALP SERPL-CCNC: 50 U/L (ref 55–135)
ALT SERPL W/O P-5'-P-CCNC: 15 U/L (ref 10–44)
ANION GAP SERPL CALC-SCNC: 10 MMOL/L (ref 8–16)
AST SERPL-CCNC: 21 U/L (ref 10–40)
B-HCG UR QL: NEGATIVE
BACTERIA #/AREA URNS HPF: ABNORMAL /HPF
BASOPHILS # BLD AUTO: 0.04 K/UL (ref 0–0.2)
BASOPHILS NFR BLD: 0.5 % (ref 0–1.9)
BILIRUB SERPL-MCNC: 0.3 MG/DL (ref 0.1–1)
BILIRUB UR QL STRIP: NEGATIVE
BUN SERPL-MCNC: 10 MG/DL (ref 6–20)
CALCIUM SERPL-MCNC: 9.5 MG/DL (ref 8.7–10.5)
CHLORIDE SERPL-SCNC: 104 MMOL/L (ref 95–110)
CLARITY UR: CLEAR
CO2 SERPL-SCNC: 23 MMOL/L (ref 23–29)
COLOR UR: YELLOW
CREAT SERPL-MCNC: 0.9 MG/DL (ref 0.5–1.4)
CTP QC/QA: YES
DIFFERENTIAL METHOD: ABNORMAL
EOSINOPHIL # BLD AUTO: 0 K/UL (ref 0–0.5)
EOSINOPHIL NFR BLD: 0.2 % (ref 0–8)
ERYTHROCYTE [DISTWIDTH] IN BLOOD BY AUTOMATED COUNT: 15.9 % (ref 11.5–14.5)
EST. GFR  (NO RACE VARIABLE): >60 ML/MIN/1.73 M^2
GLUCOSE SERPL-MCNC: 102 MG/DL (ref 70–110)
GLUCOSE UR QL STRIP: NEGATIVE
HCT VFR BLD AUTO: 31.3 % (ref 37–48.5)
HGB BLD-MCNC: 9.5 G/DL (ref 12–16)
HGB UR QL STRIP: ABNORMAL
HYALINE CASTS #/AREA URNS LPF: 0 /LPF
IMM GRANULOCYTES # BLD AUTO: 0.02 K/UL (ref 0–0.04)
IMM GRANULOCYTES NFR BLD AUTO: 0.2 % (ref 0–0.5)
KETONES UR QL STRIP: NEGATIVE
LEUKOCYTE ESTERASE UR QL STRIP: NEGATIVE
LYMPHOCYTES # BLD AUTO: 1.6 K/UL (ref 1–4.8)
LYMPHOCYTES NFR BLD: 18.8 % (ref 18–48)
MCH RBC QN AUTO: 23.7 PG (ref 27–31)
MCHC RBC AUTO-ENTMCNC: 30.4 G/DL (ref 32–36)
MCV RBC AUTO: 78 FL (ref 82–98)
MICROSCOPIC COMMENT: ABNORMAL
MONOCYTES # BLD AUTO: 0.5 K/UL (ref 0.3–1)
MONOCYTES NFR BLD: 5.9 % (ref 4–15)
NEUTROPHILS # BLD AUTO: 6.3 K/UL (ref 1.8–7.7)
NEUTROPHILS NFR BLD: 74.4 % (ref 38–73)
NITRITE UR QL STRIP: NEGATIVE
NRBC BLD-RTO: 0 /100 WBC
PH UR STRIP: 7 [PH] (ref 5–8)
PLATELET # BLD AUTO: 446 K/UL (ref 150–450)
PMV BLD AUTO: 8.7 FL (ref 9.2–12.9)
POTASSIUM SERPL-SCNC: 4 MMOL/L (ref 3.5–5.1)
PROT SERPL-MCNC: 8 G/DL (ref 6–8.4)
PROT UR QL STRIP: ABNORMAL
RBC # BLD AUTO: 4.01 M/UL (ref 4–5.4)
RBC #/AREA URNS HPF: 17 /HPF (ref 0–4)
SODIUM SERPL-SCNC: 137 MMOL/L (ref 136–145)
SP GR UR STRIP: >1.03 (ref 1–1.03)
SQUAMOUS #/AREA URNS HPF: 0 /HPF
URN SPEC COLLECT METH UR: ABNORMAL
UROBILINOGEN UR STRIP-ACNC: ABNORMAL EU/DL
WBC # BLD AUTO: 8.44 K/UL (ref 3.9–12.7)
WBC #/AREA URNS HPF: 2 /HPF (ref 0–5)

## 2023-04-28 PROCEDURE — 25000003 PHARM REV CODE 250: Performed by: EMERGENCY MEDICINE

## 2023-04-28 PROCEDURE — 25000003 PHARM REV CODE 250: Performed by: PHYSICIAN ASSISTANT

## 2023-04-28 PROCEDURE — 81025 URINE PREGNANCY TEST: CPT | Performed by: PHYSICIAN ASSISTANT

## 2023-04-28 PROCEDURE — 96374 THER/PROPH/DIAG INJ IV PUSH: CPT

## 2023-04-28 PROCEDURE — 81000 URINALYSIS NONAUTO W/SCOPE: CPT | Performed by: PHYSICIAN ASSISTANT

## 2023-04-28 PROCEDURE — 63600175 PHARM REV CODE 636 W HCPCS: Performed by: PHYSICIAN ASSISTANT

## 2023-04-28 PROCEDURE — 96372 THER/PROPH/DIAG INJ SC/IM: CPT | Mod: 59 | Performed by: EMERGENCY MEDICINE

## 2023-04-28 PROCEDURE — 63600175 PHARM REV CODE 636 W HCPCS: Performed by: EMERGENCY MEDICINE

## 2023-04-28 PROCEDURE — 85025 COMPLETE CBC W/AUTO DIFF WBC: CPT | Performed by: PHYSICIAN ASSISTANT

## 2023-04-28 PROCEDURE — 80053 COMPREHEN METABOLIC PANEL: CPT | Performed by: PHYSICIAN ASSISTANT

## 2023-04-28 PROCEDURE — 99284 EMERGENCY DEPT VISIT MOD MDM: CPT | Mod: 25

## 2023-04-28 RX ORDER — LIDOCAINE 50 MG/G
1 PATCH TOPICAL
Status: DISCONTINUED | OUTPATIENT
Start: 2023-04-28 | End: 2023-04-28 | Stop reason: HOSPADM

## 2023-04-28 RX ORDER — LIDOCAINE 50 MG/G
1 PATCH TOPICAL DAILY
Qty: 7 PATCH | Refills: 0 | Status: SHIPPED | OUTPATIENT
Start: 2023-04-28 | End: 2023-05-05

## 2023-04-28 RX ORDER — ONDANSETRON 2 MG/ML
4 INJECTION INTRAMUSCULAR; INTRAVENOUS
Status: COMPLETED | OUTPATIENT
Start: 2023-04-28 | End: 2023-04-28

## 2023-04-28 RX ORDER — IBUPROFEN 800 MG/1
800 TABLET ORAL EVERY 8 HOURS PRN
Qty: 20 TABLET | Refills: 0 | Status: SHIPPED | OUTPATIENT
Start: 2023-04-28

## 2023-04-28 RX ORDER — ORPHENADRINE CITRATE 30 MG/ML
60 INJECTION INTRAMUSCULAR; INTRAVENOUS
Status: COMPLETED | OUTPATIENT
Start: 2023-04-28 | End: 2023-04-28

## 2023-04-28 RX ORDER — ACETAMINOPHEN 500 MG
1000 TABLET ORAL
Status: COMPLETED | OUTPATIENT
Start: 2023-04-28 | End: 2023-04-28

## 2023-04-28 RX ADMIN — ACETAMINOPHEN 1000 MG: 500 TABLET, FILM COATED ORAL at 07:04

## 2023-04-28 RX ADMIN — ONDANSETRON 4 MG: 2 INJECTION INTRAMUSCULAR; INTRAVENOUS at 07:04

## 2023-04-28 RX ADMIN — ORPHENADRINE CITRATE 60 MG: 30 INJECTION INTRAMUSCULAR; INTRAVENOUS at 08:04

## 2023-04-28 RX ADMIN — LIDOCAINE 1 PATCH: 50 PATCH TOPICAL at 08:04

## 2023-04-28 NOTE — ED TRIAGE NOTES
"Pt arrived with c/o R flank pain and R lower back pain.  Pt denies any recent falls, trauma, or heavy lifting.  Pt reports her R leg "feels like is falling asleep from time to time."  Pt deneis any unilateral weakness.  Pt reports "an odor when she pees."  Denies any other urinary symptoms.  Denies any fever.  Pt denies taking any OTC meds for her symptoms.  "

## 2023-04-28 NOTE — FIRST PROVIDER EVALUATION
Emergency Department TeleTriage Encounter Note      CHIEF COMPLAINT    Chief Complaint   Patient presents with    Flank Pain     C/o right flank pain 10/10 radiates to back that began today. -urinary symptoms. Denies any other complaints. VSS       VITAL SIGNS   Initial Vitals [04/28/23 1436]   BP Pulse Resp Temp SpO2   133/73 70 18 98.7 °F (37.1 °C) 100 %      MAP       --            ALLERGIES    Review of patient's allergies indicates:  No Known Allergies    PROVIDER TRIAGE NOTE  Patient presents with flank pain that started today. No dysuria. No history of renal stones.       ORDERS  Labs Reviewed - No data to display    ED Orders (720h ago, onward)      None              Virtual Visit Note: The provider triage portion of this emergency department evaluation and documentation was performed via [employer], a HIPAA-compliant telemedicine application, in concert with a tele-presenter in the room. A face to face patient evaluation with one of my colleagues will occur once the patient is placed in an emergency department room.      DISCLAIMER: This note was prepared with [employer] voice recognition transcription software. Garbled syntax, mangled pronouns, and other bizarre constructions may be attributed to that software system.

## 2023-06-20 ENCOUNTER — PATIENT MESSAGE (OUTPATIENT)
Dept: RESEARCH | Facility: HOSPITAL | Age: 28
End: 2023-06-20
Payer: MEDICAID

## 2023-08-21 ENCOUNTER — HOSPITAL ENCOUNTER (EMERGENCY)
Facility: HOSPITAL | Age: 28
Discharge: HOME | End: 2023-08-21
Attending: EMERGENCY MEDICINE
Payer: MEDICAID

## 2023-08-21 VITALS
SYSTOLIC BLOOD PRESSURE: 123 MMHG | BODY MASS INDEX: 22.5 KG/M2 | HEART RATE: 70 BPM | DIASTOLIC BLOOD PRESSURE: 79 MMHG | TEMPERATURE: 99 F | WEIGHT: 140 LBS | HEIGHT: 66 IN | OXYGEN SATURATION: 99 % | RESPIRATION RATE: 18 BRPM

## 2023-08-21 DIAGNOSIS — S41.112A LACERATION OF LEFT UPPER EXTREMITY, INITIAL ENCOUNTER: ICD-10-CM
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
## 2023-08-21 DIAGNOSIS — S11.91XA LACERATION OF NECK DUE TO ALTERCATION, INITIAL ENCOUNTER: Primary | ICD-10-CM

## 2023-08-21 DIAGNOSIS — S61.215A LACERATION OF LEFT RING FINGER WITHOUT FOREIGN BODY, NAIL DAMAGE STATUS UNSPECIFIED, INITIAL ENCOUNTER: ICD-10-CM

## 2023-08-21 DIAGNOSIS — S61.213A LACERATION OF LEFT MIDDLE FINGER, FOREIGN BODY PRESENCE UNSPECIFIED, NAIL DAMAGE STATUS UNSPECIFIED, INITIAL ENCOUNTER: ICD-10-CM

## 2023-08-21 PROCEDURE — 12006 RPR S/N/A/GEN/TRK20.1-30.0CM: CPT

## 2023-08-21 PROCEDURE — 99284 EMERGENCY DEPT VISIT MOD MDM: CPT | Mod: 25

## 2023-08-21 PROCEDURE — 25000003 PHARM REV CODE 250: Performed by: PHYSICIAN ASSISTANT

## 2023-08-21 RX ORDER — OXYCODONE AND ACETAMINOPHEN 5; 325 MG/1; MG/1
1 TABLET ORAL EVERY 6 HOURS PRN
Qty: 7 TABLET | Refills: 0 | Status: SHIPPED | OUTPATIENT
Start: 2023-08-21

## 2023-08-21 RX ORDER — SULFAMETHOXAZOLE AND TRIMETHOPRIM 800; 160 MG/1; MG/1
1 TABLET ORAL 2 TIMES DAILY
Qty: 14 TABLET | Refills: 0 | Status: SHIPPED | OUTPATIENT
Start: 2023-08-21 | End: 2023-08-28

## 2023-08-21 RX ORDER — LIDOCAINE HYDROCHLORIDE 10 MG/ML
10 INJECTION INFILTRATION; PERINEURAL
Status: COMPLETED | OUTPATIENT
Start: 2023-08-21 | End: 2023-08-21

## 2023-08-21 RX ORDER — LIDOCAINE HYDROCHLORIDE 10 MG/ML
10 INJECTION INFILTRATION; PERINEURAL
Status: DISCONTINUED | OUTPATIENT
Start: 2023-08-21 | End: 2023-08-21 | Stop reason: HOSPADM

## 2023-08-21 RX ORDER — SULFAMETHOXAZOLE AND TRIMETHOPRIM 800; 160 MG/1; MG/1
1 TABLET ORAL
Status: COMPLETED | OUTPATIENT
Start: 2023-08-21 | End: 2023-08-21

## 2023-08-21 RX ORDER — OXYCODONE AND ACETAMINOPHEN 5; 325 MG/1; MG/1
1 TABLET ORAL
Status: COMPLETED | OUTPATIENT
Start: 2023-08-21 | End: 2023-08-21

## 2023-08-21 RX ADMIN — LIDOCAINE HYDROCHLORIDE 10 ML: 10 INJECTION, SOLUTION INFILTRATION; PERINEURAL at 02:08

## 2023-08-21 RX ADMIN — SULFAMETHOXAZOLE AND TRIMETHOPRIM 1 TABLET: 800; 160 TABLET ORAL at 03:08

## 2023-08-21 RX ADMIN — OXYCODONE HYDROCHLORIDE AND ACETAMINOPHEN 1 TABLET: 5; 325 TABLET ORAL at 03:08

## 2023-08-21 NOTE — DISCHARGE INSTRUCTIONS
Keep the wounds dry for the next 12-24 hours, after the clean them daily with soap and water   Take antibiotics until complete   Follow-up with your primary doctor  Return to the ER for any new or worsening symptoms   Stitches should be removed in 7-10 days.      Thank you for coming to our Emergency Department today. It is important to remember that some problems are difficult to diagnose and may not be found during your Emergency Department visit. Be sure to follow up with your primary care doctor and review all labs/imaging/tests that were performed during this visit with them. Some labs/tests may be outside of the normal range and require non-emergent follow-up and further investigation to help diagnose/exclude/prevent complications or other medical conditions.    If you do not have a primary care doctor, you may contact the one listed on your discharge paperwork or you may also call the [facility] Appointment Desk at [phone] to schedule an appointment and establish care with one. It is important to your health that you have a primary care doctor.    Please take all medications as directed. All medications may potentially have side-effects and it is impossible to predict which medications may give you side-effects or what side-effects (if any) they will give you.. If you feel that you are having a negative effect or side-effect of any medication you should immediately stop taking them and seek medical attention. If you feel that you are having a life-threatening reaction call 911.    Return to the ER with any questions/concerns, new/concerning symptoms, worsening or failure to improve.     Do not drive, swim, climb to height, take a bath or make any important decisions for 24 hours if you have received any pain medications, sedatives or mood altering drugs during your ER visit.      Thank you for coming to our Emergency Department today. It is important to remember that some problems are difficult to  diagnose and may not be found during your Emergency Department visit. Be sure to follow up with your primary care doctor and review all labs/imaging/tests that were performed during this visit with them. Some labs/tests may be outside of the normal range and require non-emergent follow-up and further investigation to help diagnose/exclude/prevent complications or other medical conditions.    If you do not have a primary care doctor, you may contact the one listed on your discharge paperwork or you may also call the [facility] Appointment Desk at [phone] to schedule an appointment and establish care with one. It is important to your health that you have a primary care doctor.    Please take all medications as directed. All medications may potentially have side-effects and it is impossible to predict which medications may give you side-effects or what side-effects (if any) they will give you.. If you feel that you are having a negative effect or side-effect of any medication you should immediately stop taking them and seek medical attention. If you feel that you are having a life-threatening reaction call 911.    Return to the ER with any questions/concerns, new/concerning symptoms, worsening or failure to improve.     Do not drive, swim, climb to height, take a bath or make any important decisions for 24 hours if you have received any pain medications, sedatives or mood altering drugs during your ER visit.
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
## 2024-01-18 NOTE — ASSESSMENT & PLAN NOTE
Start AZT at 2mg/kg loading dose then AZT 1mg/kg/hr for 3 hours.   Plan for delivery at 3 hours after starting AZT.   Delivery today due to unknown viral load and contractions.   Start combivir and kaletra after delivery.    - - -

## (undated) DEVICE — DRESSING AQUACEL AG ADV 3.5X12